# Patient Record
Sex: FEMALE | Race: WHITE | NOT HISPANIC OR LATINO | Employment: STUDENT | URBAN - METROPOLITAN AREA
[De-identification: names, ages, dates, MRNs, and addresses within clinical notes are randomized per-mention and may not be internally consistent; named-entity substitution may affect disease eponyms.]

---
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
## 2021-01-13 ENCOUNTER — OFFICE VISIT - HEALTHEAST (OUTPATIENT)
Dept: OTOLARYNGOLOGY | Facility: CLINIC | Age: 21
End: 2021-01-13

## 2021-01-13 DIAGNOSIS — Z90.89 S/P TONSILLECTOMY: ICD-10-CM

## 2021-01-14 ENCOUNTER — OFFICE VISIT - HEALTHEAST (OUTPATIENT)
Dept: PEDIATRICS | Facility: CLINIC | Age: 21
End: 2021-01-14

## 2021-01-14 DIAGNOSIS — F41.9 ANXIETY: ICD-10-CM

## 2021-01-14 DIAGNOSIS — D50.9 IRON DEFICIENCY ANEMIA, UNSPECIFIED IRON DEFICIENCY ANEMIA TYPE: ICD-10-CM

## 2021-01-14 DIAGNOSIS — R53.83 FATIGUE, UNSPECIFIED TYPE: ICD-10-CM

## 2021-01-14 ASSESSMENT — ANXIETY QUESTIONNAIRES
1. FEELING NERVOUS, ANXIOUS, OR ON EDGE: MORE THAN HALF THE DAYS
IF YOU CHECKED OFF ANY PROBLEMS ON THIS QUESTIONNAIRE, HOW DIFFICULT HAVE THESE PROBLEMS MADE IT FOR YOU TO DO YOUR WORK, TAKE CARE OF THINGS AT HOME, OR GET ALONG WITH OTHER PEOPLE: SOMEWHAT DIFFICULT
4. TROUBLE RELAXING: MORE THAN HALF THE DAYS
2. NOT BEING ABLE TO STOP OR CONTROL WORRYING: MORE THAN HALF THE DAYS
6. BECOMING EASILY ANNOYED OR IRRITABLE: NOT AT ALL
5. BEING SO RESTLESS THAT IT IS HARD TO SIT STILL: NOT AT ALL
GAD7 TOTAL SCORE: 8
7. FEELING AFRAID AS IF SOMETHING AWFUL MIGHT HAPPEN: NOT AT ALL
3. WORRYING TOO MUCH ABOUT DIFFERENT THINGS: MORE THAN HALF THE DAYS

## 2021-01-15 ENCOUNTER — AMBULATORY - HEALTHEAST (OUTPATIENT)
Dept: LAB | Facility: CLINIC | Age: 21
End: 2021-01-15

## 2021-01-15 DIAGNOSIS — F41.9 ANXIETY: ICD-10-CM

## 2021-01-15 DIAGNOSIS — R53.83 FATIGUE, UNSPECIFIED TYPE: ICD-10-CM

## 2021-01-15 LAB
BASOPHILS # BLD AUTO: 0.1 THOU/UL (ref 0–0.2)
BASOPHILS NFR BLD AUTO: 1 % (ref 0–2)
CHOLEST SERPL-MCNC: 161 MG/DL
EOSINOPHIL # BLD AUTO: 0.2 THOU/UL (ref 0–0.4)
EOSINOPHIL NFR BLD AUTO: 2 % (ref 0–6)
ERYTHROCYTE [DISTWIDTH] IN BLOOD BY AUTOMATED COUNT: 16.7 % (ref 11–14.5)
FASTING STATUS PATIENT QL REPORTED: YES
FERRITIN SERPL-MCNC: 17 NG/ML (ref 6–40)
HCT VFR BLD AUTO: 33.9 % (ref 35–47)
HDLC SERPL-MCNC: 49 MG/DL
HGB BLD-MCNC: 11.2 G/DL (ref 12–16)
LDLC SERPL CALC-MCNC: 79 MG/DL
LYMPHOCYTES # BLD AUTO: 2.7 THOU/UL (ref 0.8–4.4)
LYMPHOCYTES NFR BLD AUTO: 25 % (ref 20–40)
MCH RBC QN AUTO: 23.7 PG (ref 27–34)
MCHC RBC AUTO-ENTMCNC: 33 G/DL (ref 32–36)
MCV RBC AUTO: 72 FL (ref 80–100)
MONOCYTES # BLD AUTO: 0.7 THOU/UL (ref 0–0.9)
MONOCYTES NFR BLD AUTO: 6 % (ref 2–10)
NEUTROPHILS # BLD AUTO: 7.4 THOU/UL (ref 2–7.7)
NEUTROPHILS NFR BLD AUTO: 67 % (ref 50–70)
PLATELET # BLD AUTO: 353 THOU/UL (ref 140–440)
PMV BLD AUTO: 8.5 FL (ref 7–10)
RBC # BLD AUTO: 4.73 MILL/UL (ref 3.8–5.4)
T4 FREE SERPL-MCNC: 0.8 NG/DL (ref 0.7–1.8)
TRIGL SERPL-MCNC: 163 MG/DL
TSH SERPL DL<=0.005 MIU/L-ACNC: 1.02 UIU/ML (ref 0.3–5)
WBC: 11.1 THOU/UL (ref 4–11)

## 2021-01-18 ENCOUNTER — COMMUNICATION - HEALTHEAST (OUTPATIENT)
Dept: PEDIATRICS | Facility: CLINIC | Age: 21
End: 2021-01-18

## 2021-01-18 DIAGNOSIS — E55.9 VITAMIN D INSUFFICIENCY: ICD-10-CM

## 2021-01-18 LAB
25(OH)D3 SERPL-MCNC: 17.3 NG/ML (ref 30–80)
25(OH)D3 SERPL-MCNC: 17.3 NG/ML (ref 30–80)

## 2021-05-27 ASSESSMENT — PATIENT HEALTH QUESTIONNAIRE - PHQ9
SUM OF ALL RESPONSES TO PHQ QUESTIONS 1-9: 5
SUM OF ALL RESPONSES TO PHQ QUESTIONS 1-9: 6

## 2021-05-28 ASSESSMENT — ANXIETY QUESTIONNAIRES
GAD7 TOTAL SCORE: 8
GAD7 TOTAL SCORE: 3

## 2021-05-28 ASSESSMENT — ASTHMA QUESTIONNAIRES
ACT_TOTALSCORE: 25
ACT_TOTALSCORE: 25

## 2021-05-30 VITALS — WEIGHT: 167 LBS

## 2021-05-30 NOTE — PROGRESS NOTES
[facility] Well Child Check    ASSESSMENT & PLAN  Maya Fay is a 19 y.o. who has normal growth and normal development.    Diagnoses and all orders for this visit:    Anxiety  -     FLUoxetine (PROZAC) 40 MG capsule; Take 1 capsule (40 mg total) by mouth daily.  Dispense: 90 capsule; Refill: 1    Mild intermittent asthma without complication  -     albuterol (PROAIR HFA) 90 mcg/actuation inhaler; Inhale 2 puffs every 4 (four) hours as needed.  Dispense: 1 Inhaler; Refill: 5    Encounter for annual health examination  -     Chlamydia trachomatis & Neisseria gonorrhoeae, Amplified Detection  -     Hearing Screening    Anxiety  We talked about having you go back to see your therapist a few times now before heading off to school  I wonder if you have been experiencing some symptoms of mild depression  i'm glad you are starting to feel better  We decided to leave the medicine as it is for now:  Fluoxetine 40 mg daily  RTC 6 months for med check, or sooner with worsening symptoms    Intermittent Asthma  Doing well with rare need for inhaler  Refill provided    Obesity  Reviewed diet and exercise, healthy choices    Return to clinic in 1 year for a Well Child Check or sooner as needed    IMMUNIZATIONS/LABS  No immunizations due today.    REFERRALS  Dental:  Recommend routine dental care as appropriate., The patient has already established care with a dentist.  Other:  No additional referrals were made at this time.    ANTICIPATORY GUIDANCE  I have reviewed age appropriate anticipatory guidance.    HEALTH HISTORY  Do you have any concerns that you'd like to discuss today?: No concerns     I have also been following Maya for anxiety  She is taking Fluoxetine 40 mg  This was started about two years ago    Overall has been doing well  Does share that the past few weeks she's felt a little off - anxious and overwhelmed - not sure if related to going away to college    Loves to be around people and in summer she has  more time alone and sometimes this is hard when she's alone too much    Notices that anxiety can make her tired and sometimes sad  Sleeps more than usual    Previously saw a therapist but had been doing really well so the therapist advised she could stop - weaned down gradually  Yessica ENRIQUE (somewhere in [place])    Not able to be as active lately  Had shoulder surgery six months ago so couldn't be active    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 Pantoja and previously was on Symbicort but has been off this for a while  Only inhaler she uses is Albuterol      Roomed by: nate    Refills needed? No    Do you have any forms that need to be filled out? No        Do you have any significant health concerns in your family history?: No  Family History   Problem Relation Age of Onset     Seizures Father      Since your last visit, have there been any major changes in your family, such as a move, job change, separation, divorce, or death in the family?: No  Has a lack of transportation kept you from medical appointments?: No    Home  Who lives in your home?:   50%Mom and sisters and  Dads 50%  Social History     Social History Narrative     Not on file     Do you have any concerns about losing your housing?: No  Is your housing safe and comfortable?: Yes  Do you have any trouble with sleep?:  Yes    Education  What school do you child attend?:  Graduated Going to [employer]    What grade are you in?:  [employer] in [employer]  [place]   How do you perform in school (grades, behavior, attention, homework?: Good     Eating  Do you eat regular meals including fruits and vegetables?:  yes  What are you drinking (cow's milk,  "water, soda, juice, sports drinks, energy drinks, etc)?: cow's milk- skim, water and juice  Have you been worried that you don't have enough food?: No  Do you have concerns about your body or appearance?:  No    Activities  Do you have friends?:  yes  Do you get at least one hour of physical activity per day?:  yes  How many hours a day are you in front of a screen other than for schoolwork (computer, TV, phone)?: 1 hr or more   What do you do for exercise?:  Swim ,Work out   Do you have interest/participate in community activities/volunteers/school sports?:  Yes Swim Clun    MENTAL HEALTH SCREENING  PHQ-2 Total Score: 2 (7/22/2019  9:00 AM)    PHQ-9 Total Score: 8 (7/22/2019  9:00 AM)    SUDHEER-7 Total: 8 (7/22/2019  9:00 AM)  SUDHEER 7 Total Score: 3 (1/14/2019  2:00 PM)        VISION/HEARING  Vision: Completed. See Results  Hearing:  Completed. See Results    No exam data present    TB Risk Assessment:  The patient and/or parent/guardian answer positive to:  patient and/or parent/guardian answer 'no' to all screening TB questions    Dyslipidemia Risk Screening  Have either of your parents or any of your grandparents had a stroke or heart attack before age 55?: No  Any parents with high cholesterol or currently taking medications to treat?: Yes: maybe Dad?      Dental  When was the last time you saw the dentist?: 3-6 months ago   Parent/Guardian declines the fluoride varnish application today. Fluoride not applied today.    Patient Active Problem List   Diagnosis     Overweight     Allergic Rhinitis     Vocal cord dysfunction     Anxiety     Lumbar herniated disc     Mild intermittent asthma without complication       Drugs  Does the patient use tobacco/alcohol/drugs?:  no    Safety  Does the patient have any safety concerns (peer or home)?:  no  Does the patient use safety belts, helmets and other safety equipment?:  yes    Sex  Have you ever had sex?:  No    MEASUREMENTS  Height:  5' 5\" (1.651 m)  Weight: (!) 221 lb " (100.2 kg)  BMI: Body mass index is 36.78 kg/m .  Blood Pressure: 110/60  Blood pressure percentiles are not available for patients who are 18 years or older.    PHYSICAL EXAM  GEN: alert, well appearing  EYES: clear, nl red reflex  R EAR: canal clear, TM pearly gray  L EAR: canal clear, TM pearly gray  NOSE: clear  OROPHARYNX: clear  NECK: supple, no significant LAD  CVS: RRR, no murmur  LUNGS: clear, no increased work of breathing  ABD: soft, non-tender, non-distended  : nl female sherrie 4/5  EXT: warm, well perfused, no swelling  MSK: nl muscle bulk, spine straight  NEURO: CN grossly intact, nl strength in UE and LE, nl gait, no dysmetria  SKIN: clear        Jennifer Conte MD

## 2021-05-31 VITALS — BODY MASS INDEX: 30.59 KG/M2 | HEIGHT: 65 IN | WEIGHT: 183.6 LBS

## 2021-05-31 VITALS — WEIGHT: 167.9 LBS | HEIGHT: 65 IN | BODY MASS INDEX: 27.97 KG/M2

## 2021-05-31 VITALS — HEIGHT: 65 IN | BODY MASS INDEX: 29.87 KG/M2 | WEIGHT: 179.3 LBS

## 2021-06-01 VITALS — WEIGHT: 183 LBS | BODY MASS INDEX: 30.93 KG/M2

## 2021-06-01 VITALS — WEIGHT: 201 LBS | BODY MASS INDEX: 33.49 KG/M2 | HEIGHT: 65 IN

## 2021-06-02 VITALS — BODY MASS INDEX: 34.89 KG/M2 | HEIGHT: 65 IN | WEIGHT: 209.4 LBS

## 2021-06-02 NOTE — TELEPHONE ENCOUNTER
Referral Request  Type of referral: Psychiatry  Who s requesting: Patient's mom, Ely (no consent)  Why the request: Caller stated the patient told her the anxiety medication is not helping to cover all of the patient's depression and anxiety. Caller stated the patient would like a more comprehensive evaluation from a mental health provider.  Have you been seen for this request: No  Does patient have a preference on a group/provider? No  Okay to leave a detailed message?  No  [phone] - to reach the patient

## 2021-06-02 NOTE — TELEPHONE ENCOUNTER
I called and spoke with Maya  I offered that I would be happy to see her regarding this concern also if she would like to schedule with me  Did let her know about a couple options for psychiatric med management if they'd prefer to go that route:    [facility]  [address] [place] [address]  [phone]    [facility]  [phone]    Maya did let me know that she would be ok with me speaking with her mom.  I asked her to sign a form here in clinic giving me and other permission to communicate with her mom directly when desired.  Maya plans to sign that when she is in next.  Maya will let her mom know that we spoke and they will decide how they want to proceed - either schedule an appointment with me here, or call to schedule with psychiatry.

## 2021-06-03 VITALS — BODY MASS INDEX: 36.82 KG/M2 | WEIGHT: 221 LBS | HEIGHT: 65 IN

## 2021-06-04 VITALS
BODY MASS INDEX: 37.09 KG/M2 | WEIGHT: 222.6 LBS | HEART RATE: 100 BPM | TEMPERATURE: 98.3 F | SYSTOLIC BLOOD PRESSURE: 110 MMHG | HEIGHT: 65 IN | DIASTOLIC BLOOD PRESSURE: 60 MMHG

## 2021-06-05 VITALS — WEIGHT: 229 LBS | HEIGHT: 65 IN | BODY MASS INDEX: 38.15 KG/M2

## 2021-06-05 VITALS
TEMPERATURE: 98.1 F | DIASTOLIC BLOOD PRESSURE: 74 MMHG | WEIGHT: 229 LBS | SYSTOLIC BLOOD PRESSURE: 112 MMHG | OXYGEN SATURATION: 99 % | HEIGHT: 65 IN | BODY MASS INDEX: 38.15 KG/M2 | HEART RATE: 70 BPM

## 2021-06-10 NOTE — PROGRESS NOTES
[facility] Well Child Check    ASSESSMENT & PLAN  Maya Fay is a 20 y.o. who has normal growth and normal development.    Diagnoses and all orders for this visit:    Tonsillar hypertrophy /  Recurrent tonsillitis  -     Ambulatory referral to ENT  Discussed possible option of tonsillectomy given recurrent infection and sleep disruption.  Recommended discussion of this with ENT - referral placed.    Mild intermittent asthma without complication  -     albuterol (PROAIR HFA) 90 mcg/actuation inhaler; Inhale 2 puffs every 4 (four) hours as needed.  Dispense: 1 Inhaler; Refill: 5    Encounter for annual health examination  -     Hearing Screening  -     Vision Screening  -     Chlamydia trachomatis & Neisseria gonorrhoeae, Amplified Detection  -     HM2(CBC w/o Differential)    Iron deficiency anemia, unspecified iron deficiency anemia type  Recommended daily iron supplement    Anxiety  Doing well on Sertraline  Followed by Psychiatry    Obesity  Discussed healthy habits including regular exercise and healthy eating      Return to clinic in 1 year for a Well Child Check or sooner as needed    IMMUNIZATIONS/LABS  No immunizations due today.    REFERRALS  Dental:  Recommend routine dental care as appropriate., The patient has already established care with a dentist.  Other:  Referrals were made for ENT    ANTICIPATORY GUIDANCE  I have reviewed age appropriate anticipatory guidance.    HEALTH HISTORY  Do you have any concerns that you'd like to discuss today?: No concerns     In the past I have followed Maya for issues related to anxiety - last year when I saw her she was taking Fluoxetine 40 mg daily and doing well  Today Maya reports that she went to see a psychiatrist last fall when she was struggling after her grandfather  - was changed to Sertraline  This has been working well - taking 100 mg daily and this is working well  Plans to continue to follow with the psychiatrist every 6 months    Had  "tonsillitis multiple times during the past year  Recalls 2-3 episodes of antibiotics but several other times when she felt her tonsils were so swollen it was hard to breathe but got better with ibuprofen  Has always caused a lot of problems with big tonsils  Maya is wondering if she should consider tonsillectomy  Feels like this is getting worse over time especially in last 2-3 years  Also feels like she's having trouble with breathing while sleeping - wakes up feeling like she's \"suffocating\"  Snoring lately which is new  Often has a sore throat  Has tried breathe right but doesn't help  Not sure if she's having any apnea  Does feel relatively well rested    Almost done with isotretinoin for acne - this is second round for Maya and happy with results    Periods are often really hard - lots of cramping and very uncomfortable  Also often gets headaches related to periods  Bleeding is more heavy lately  Periods are very irregular as well    Has Albuterol inhaler available but thinks it's been about a year since she last used it  Doing orange theory now and feels like her breathing is fine    No question data found.    Do you have any significant health concerns in your family history?: No  Family History   Problem Relation Age of Onset     Seizures Father      Since your last visit, have there been any major changes in your family, such as a move, job change, separation, divorce, or death in the family?: No  Has a lack of transportation kept you from medical appointments?: No    Home  Who lives in your home?:  Mom,dad,3 sisters  Social History     Social History Narrative     Not on file     Do you have any concerns about losing your housing?: No  Is your housing safe and comfortable?: Yes  Do you have any trouble with sleep?:  No    Education  What school do you child attend?:  [employer]  What grade are you in?:  College  How do you perform in school (grades, behavior, attention, homework?: Very well "     Eating  Do you eat regular meals including fruits and vegetables?:  yes  What are you drinking (cow's milk, water, soda, juice, sports drinks, energy drinks, etc)?: water  Have you been worried that you don't have enough food?: No  Do you have concerns about your body or appearance?:  No    Activities  Do you have friends?:  yes  Do you get at least one hour of physical activity per day?:  yes  How many hours a day are you in front of a screen other than for schoolwork (computer, TV, phone)?:  4  What do you do for exercise?:  Swim,walk  Do you have interest/participate in community activities/volunteers/school sports?:  yes    VISION/HEARING  Vision: Completed. See Results  Hearing:  Completed. See Results     Hearing Screening    125Hz 250Hz 500Hz 1000Hz 2000Hz 3000Hz 4000Hz 6000Hz 8000Hz   Right ear:   20 20 20  20 20    Left ear:   20 20 20  20 20       Visual Acuity Screening    Right eye Left eye Both eyes   Without correction: 10/10 10/10 10/10   With correction:      Comments: Plus lens passed.      MENTAL HEALTH SCREENING  No flowsheet data found.  Social-emotional & mental health screening: Pediatric Symptom Checklist-Youth PASS (<30 pass), no followup necessary  No concerns  Does have anxiety but stable and doing well       SUDHEER 7 Total Score: 3 (8/24/2020  8:00 AM)    PHQ-A Total Score 8/24/2020   PHQ-A Total Score 6         TB Risk Assessment:  The patient and/or parent/guardian answer positive to:  no known risk of TB    Dyslipidemia Risk Screening  Have either of your parents or any of your grandparents had a stroke or heart attack before age 55?: No  Any parents with high cholesterol or currently taking medications to treat?: Yes: dad     Dental  When was the last time you saw the dentist?: 3-6 months ago   Parent/Guardian declines the fluoride varnish application today. Fluoride not applied today.    Patient Active Problem List   Diagnosis     Allergic Rhinitis     Vocal cord dysfunction      "Anxiety     Lumbar herniated disc     Mild intermittent asthma without complication     Obesity without serious comorbidity, unspecified classification, unspecified obesity type     Iron deficiency anemia, unspecified iron deficiency anemia type       Drugs  Does the patient use tobacco/alcohol/drugs?: Occ moderate alcohol, no smoking/vaping/drugs  Safety  Does the patient have any safety concerns (peer or home)?:  no  Does the patient use safety belts, helmets and other safety equipment?:  yes    Sex  Have you ever had sex?:  No    MEASUREMENTS  Height:  5' 5\" (1.651 m)  Weight: (!) 222 lb 9.6 oz (101 kg)  BMI: Body mass index is 37.04 kg/m .  Blood Pressure: 110/60  Blood pressure percentiles are not available for patients who are 18 years or older.    PHYSICAL EXAM  GEN: alert, well appearing  EYES: clear, nl red reflex  R EAR: canal clear, TM pearly gray  L EAR: canal clear, TM pearly gray  NOSE: clear  OROPHARYNX: clear - tonsils 3+ and normal in appearance  NECK: supple, no significant LAD  CVS: RRR, no murmur  LUNGS: clear, no increased work of breathing  ABD: soft, non-tender, non-distended  : deferred  EXT: warm, well perfused, no swelling  MSK: nl muscle bulk, spine straight  NEURO: CN grossly intact, nl strength in UE and LE, nl gait, no dysmetria  SKIN: clear        Jennifer Conte MD    "

## 2021-06-11 NOTE — PROGRESS NOTES
HISTORY OF PRESENT ILLNESS  Asked to see by Dr. Conte for evaluation intermittent pain in throat. Patient reports that she wants her tonsils removed. She reports that they are very uncomfortable. They are swollen a lot. She has had tonsillitis numerous times. Every time she is sick they swell up. She reports that once a month they swell. They are also sore. It has gotten noticeably worse in the last year. She reports more frequent sore throats, swollen tonsillitis and snoring. She was diagnosed with tonsillitis 3-4 times in the first 6 months of the year. Patient has missed school prior to COVID precautions.     REVIEW OF SYSTEMS  Review of Systems: a 10-system review was performed. Pertinent positives are noted in the HPI and on a separate scanned document in the chart.    PMH, PSH, FH and SH has documented in the EHR.      EXAM    CONSTITUTIONAL  General Appearance:  Normal, well developed, well nourished, no obvious distress  Ability to Communicate:  communicates appropriately.    HEAD AND FACE  Appearance and Symmetry:  Normal, no scalp or facial scarring or suspicious lesions.  Paranasal sinuses tenderness:  Normal, Paranasal sinuses non tender    EARS  Clinical speech reception threshold:  Normal, able to hear normal speech.  Auricle:  Normal, Auricles without scars, lesions, masses.  External auditory canal:  Normal, External auditory canal normal.  Tympanic membrane:  Normal, Tympanic membranes normal without swelling or erythema.  Tympanic membrane mobility:  Normal, Normal tympanic membrane mobility.    NOSE (speculum or scope)  Architecture:  Normal, Grossly normal external nasal architecture with no masses or lesions.  Mucosa:  Normal mucosa, No polyps or masses.  Septum:  Normal, Septum non-obstructing.  Turbinates:  Normal, No turbinate abnormalities    ORAL CAVITY AND OROPHARYNX  Lips:  Normal.  Dental and gingiva:  Normal, No obvious dental or gingival disease.  Tonsil: 2-3+. She admits that  today they aren't swollen and she is feeling fine.  Mucosa:  Normal, Moist mucous membranes.  Tongue:  Normal, Tongue mobile with no mucosal abnormalities  Hard and soft palate:  Normal, Hard and soft palate without cleft or mucosal lesions.  Oral pharynx:  Normal, Posterior pharynx without lesions or remarkable asymmetry.  Saliva:  Normal, Clear saliva.  Masses:  Normal, No palpable masses or pathologically enlarged lymph nodes.    NECK  Masses/lymph nodes:  Normal, No worrisome neck masses or lymph nodes.  Salivary glands:  Normal, Parotid and submandibular glands.  Trachea and larynx position:  Normal, Trachea and larynx midline.  Thyroid:  Normal, No thyroid abnormality.  Tenderness:  Normal, No cervical tenderness.  Suppleness:  Normal, Neck supple    NEUROLOGICAL  Speech pattern:  Normal, Proasaic    RESPIRATORY  Symmetry and Respiratory effort:  Normal, Symmetric chest movement and expansion with no increased intercostal retractions or use of accessory muscles.     IMPRESSION  Recurrent sore throat and tonsillitis. She reports her snoring has worsened with he enlarged tonsils.     RECOMMENDATION  I discussed tonsillectomy, goals and risks. I answered her questions. She is going to think about timing and call to schedule.    Malachi Lee MD

## 2021-06-11 NOTE — PROGRESS NOTES
[facility] Well Child Check    ASSESSMENT & PLAN  Maya Fay is a 17  y.o. 5  m.o. who has normal growth and normal development.    Diagnoses and all orders for this visit:    Encounter for routine child health examination without abnormal findings  -     Meningococcal MCV4P  -     Hemoglobin  -     Lipid Profile  -     Hearing Screening  -     Vision Screening  -     Vitamin D, Total (25-Hydroxy)    Anxiety  -     Thyroid Cascade    Other orders  -     FLUoxetine (PROZAC) 10 MG tablet; Take 1 tablet (10 mg total) by mouth daily.  Dispense: 30 tablet; Refill: 0  -     Meningococcal B (PF)        Overweight  BMI at 96 percentile  Discussed diet - limiting snacking and being mindful of healthy choices  Discussed exercise - doing well with this    The following nutrition counseling was performed this visit:  recommendation to change food intake  The following physical activity counseling was performed this visit: giving encouragement to exercise    Anxiety - new diagnosis today (although symptoms have been present chronically)  Discussed with mom and Maya together, as well as with Maya privately  Encouraged establishing care with a counselor - Maya is interested in this - list of options provided  Given the chronic nature of symptoms and impact on daily life, discussed option of using anti-anxiety medication - mom and Maya are both interested in proceeding with this - Maya states she just wants to feel better    Starting Fluoxetine 10 mg daily - Rx sent to pharmacy for one month supply  Please return in two weeks to follow-up    I will put you on my schedule for:  Monday July 31 at 9am    Mental Health Providers - list provided    In addition to usual well care, 15 minutes spent in discussion of mental health concerns    Return to clinic in 1 year for a Well Child Check or sooner as needed    IMMUNIZATIONS/LABS  Immunizations were reviewed and orders were placed as appropriate. and I have  discussed the risks and benefits of all of the vaccine components with the patient/parents.  All questions have been answered.    REFERRALS  Dental:  Recommend routine dental care as appropriate., The patient has already established care with a dentist.  Other:  No additional referrals were made at this time.    ANTICIPATORY GUIDANCE  I have reviewed age appropriate anticipatory guidance.    HEALTH HISTORY  Do you have any concerns that you'd like to discuss today?: having some anxiety lately and has been stressed out, was told she was anemic while trying to give blood this summer      This past spring, tried to donate blood but was told hemoglobin was borderline low and wasn't able to donate    Does take vitamin D but lately not been regular with taking multivitamin    Lately noticing more trouble with worrying and anxiety  Has always tended to be a bit anxious but noticing this more lately  Can stress out about swimming, school, job, friends  Did previously see a counselor around time when parents split up    Does see this coming up in life more lately and affecting things  First noticed in swimming last fall - best one in practice but not as good in meets  Not doing as well in school - still fine - As and Bs but feels like she should be getting all As  Noticing her mind wnaders during day - due to worrying  Sometimes takes on worries from other people    Thinks she may have had some little panic attacks during school year  Struggles with change in routine     Even when she has a really good day, has a hard time enjoying it 100% - always feels like she's waiting for something bad to happen  Otherwise does feel as though she is a happy person     Reports doing well with sleeping - good sleeper    There is family history - especially on dad's side - he suffers from anxiety, as well as paternal grandmother    Does see dermatologist for acne  Uses topical creams for this  Starting a new OCP next month to try  this  Reports that periods are fairly irregular  No heavy bleeding     Continues to see Dr. Pantoja related to asthma  Doing well with this lately        Roomed by: anny    Accompanied by Mother    Refills needed? No    Do you have any forms that need to be filled out? No        Do you have any significant health concerns in your family history?: No  No family history on file.  Since your last visit, have there been any major changes in your family, such as a move, job change, separation, divorce, or death in the family?: No    Home  Who lives in your home?:  Mom-50% and dad- 50%- also has 3 sisters  Social History     Social History Narrative     Do you have any trouble with sleep?:  No    Education  What school does your child attend?:  [employer]  What grade is your child in?:  11th- fall 2017  How does the patient perform in school (grades, behavior, attention, homework?: no issues     Eating  Does patient eat regular meals including fruits and vegetables?:  yes  What is the patient drinking (cow's milk, water, soda, juice, sports drinks, energy drinks, etc)?: cow's milk- skim, water and juice  Does patient have concerns about body or appearance?:  No    Activities  Does the patient have friends?:  yes  Does the patient get at least one hour of physical activity per day?:  yes, swim  Does the patient have less than 2 hours of screen time per day (aside from homework)?:  no, 1-6hrs  What does your child do for exercise?:  swimming  Does the patient have interest/participate in community activities/volunteers/school sports?:  No, but has a job  Being a  for [employer] SCREENING  PHQ-2 Total Score: 0 (7/17/2017  8:00 AM)  No Data Recorded     SUDHEER-7 Total: 13 (7/17/2017  8:00 AM)         VISION/HEARING  Vision: Completed. See Results  Hearing:  Completed. See Results     Hearing Screening    125Hz 250Hz 500Hz 1000Hz 2000Hz 3000Hz 4000Hz 6000Hz 8000Hz   Right ear:   20 20 20  20    "  Left ear:   20 20 20  20        Visual Acuity Screening    Right eye Left eye Both eyes   Without correction: 20/15 20/15    With correction:          TB Risk Assessment:  The patient and/or parent/guardian answer positive to:  patient and/or parent/guardian answer 'no' to all screening TB questions    Dental  Is your child being seen by a dentist?  Yes  Flouride Varnish Application Screening  Is child seen by dentist?     Yes    Patient Active Problem List   Diagnosis     Overweight     Cough Variant Asthma     Mild persistent asthma without complication     Allergic Rhinitis     Vocal cord dysfunction       Drugs  Does the patient use tobacco/alcohol/drugs?:  no    Safety  Does the patient have any safety concerns (peer or home)?:  no  Does the patient use safety belts, helmets and other safety equipment?:  yes    Sex  Is the patient sexually active?:  No    HEADSS - all negative except mental health as described above  Denies any suicidal thoughts    MEASUREMENTS  Height:  5' 4.5\" (1.638 m)  Weight: 183 lb 9.6 oz (83.3 kg)  BMI: Body mass index is 31.03 kg/(m^2).  Blood Pressure: 112/68  Blood pressure percentiles are 50 % systolic and 55 % diastolic based on NHBPEP's 4th Report. Blood pressure percentile targets: 90: 125/81, 95: 129/84, 99 + 5 mmH/97.    PHYSICAL EXAM  GEN: alert, well appearing  EYES: clear  R EAR: canal clear, TM pearly gray  L EAR: canal clear, TM pearly gray  NOSE: clear  OROPHARYNX: clear  NECK: supple, no significant LAD  CVS: RRR, no murmur  LUNGS: clear, no increased work of breathing  ABD: soft, non-tender, non-distended  EXT: warm, well perfused, no swelling  MSK: nl muscle bulk, spine straight - normal screening sports exam  NEURO: CN grossly intact, nl strength in UE and LE, nl gait, no dysmetria  SKIN: clear        Jennifer Conte MD    "

## 2021-06-12 NOTE — TELEPHONE ENCOUNTER
Returned a call from Maya's mother (Ely) regarding surgery scheduling. They were looking for a date around December 16th -18th for surgery.   I left her a message letting her know I was able to get Maya scheduled for 12/18 I will be sending out a letter with detailed information to their home address. If they have any questions to call me.

## 2021-06-12 NOTE — PROGRESS NOTES
"[facility] Note   7/31/2017 9:09 AM     HPI:    Here for F/U regarding anxiety  I saw Maya for wellness visit two weeks ago and we discussed concern with anxiety - suggested looking for a counselor and also started Fluoxetine 10 mg daily    Initially noticed some stomach ache and felt groggy  Taking in evening now which is better  No side effects any more    Overall has noticed a big improvement   Still some worries but able to get over it more quickly    Sleeping  better    Mom notices that she seems lighter, more able to joke around    Dose seems good  Still working on finding a counselor - hoping to find someone close to home      PHYSICAL EXAM:   /66  Pulse 68  Ht 5' 4.5\" (1.638 m)  Wt 179 lb 4.8 oz (81.3 kg)  LMP 07/23/2017  BMI 30.3 kg/m2      GEN: no distress, pleasant and chatty  PSYCH: good eye contact, normal affect    PHQ-9 Total Score: 0 (7/31/2017  9:00 AM)  SUDHEER-7 Total: 3 (7/31/2017  9:00 AM)      ASSESSMENT:    Anxiety - doing well on new medication Fluoxetine    PLAN:    Look for locations near you:  [facility]  [facility]  I still feel as though a counselor would be helpful moving forward    If all is going well, return in about 2-3 months for next follow-up visit.  Please call or return sooner with any worsening or concerns    Continue on same medication, same dose - Fluoxetine 10 mg daily - I sent new Rx to pharmacy with two refills    Gianna Conte MD       "

## 2021-06-12 NOTE — TELEPHONE ENCOUNTER
Ely is calling to find out when her daughter's surgery is scheduled. She is a student and is requesting the surgery on 12/16 or 12/17 if possible. She can be reached at [phone].

## 2021-06-12 NOTE — TELEPHONE ENCOUNTER
Spoke with Ely over the phone. Appears she didn't receive my confirmation voicemail regarding Maya's surgery being set for 12/18. That date did work and they will be looking for the letter I sent in the mail. No further questions asked

## 2021-06-13 NOTE — TELEPHONE ENCOUNTER
Attempted phone call to Maya to help with scheduling appointment. There was no answer and mailbox was full.

## 2021-06-13 NOTE — TELEPHONE ENCOUNTER
Left a message for Maya that [facility] has requested a paper covid of her positive covid test from November at the day of surgery. She will not be able to have surgery without this. Notified [facility] that I left her a message.

## 2021-06-13 NOTE — PROGRESS NOTES
Preoperative Exam    Scheduled Procedure: TONSILLECTOMY   Surgery Date:  12/18/2020  Surgery Location: [facility], fax [phone]  Surgeon:  Dr. Lee     Assessment/Plan:     Pre-Op Exam  Tonsillar Hypertrophy  Snoring  Recurrent sore throat and tonsillitis  Intermittent Asthma - stable now and doing well  Obesity     Surgical Procedure Risk: Low (reported cardiac risk generally < 1%)  Have you had prior anesthesia?: Yes  Have you or any family members had a previous anesthesia reaction: No  Do you or any family members have a history of a clotting or bleeding disorder?:  No    APPROVAL GIVEN to proceed with proposed procedure, without further diagnostic evaluation    No special considerations    Functional Status: Age Appropriate [place]  Patient plans to recover at home with family.  Do you have any concerns regarding care after surgery?: No     Subjective:      Maya Fay is a 20 y.o. female who presents for a preoperative consultation.  Maya has a history of enlarged tonsils, recurrent sore throat and snoring - plans for tonsillectomy with Dr. Lee.  Otherwise Maya feels good today.  She did have covid in early November - symptoms were not too bad overall (felt achey all over, sore throat, headache and body aches and fever x one day - had positive test at the time) and she has completely recovered now.  Has had follow-up covid testing since illness and have been negative at last check.  No recent breathing concerns.    All other systems reviewed and are negative, other than those listed in the HPI.    Pertinent History  Any croup, wheezing or respiratory illness in the past 3 weeks?:  No  History of obstructive sleep apnea: No  Steroid use in the last 6 months: No  Any ibuprofen, NSAID or aspirin use in the last 2 weeks?: No  Prior Blood Transfusion: No  Prior Blood Transfusion Reaction: No  If for some reason prior to, during or after the procedure, if it is medically  indicated, would you be willing to have a blood transfusion?:  There is no transfusion refusal.  Any exposure in the past 3 weeks to chicken pox, Fifth disease, whooping cough, measles, tuberculosis?: No    Current Outpatient Medications   Medication Sig Dispense Refill     albuterol (PROAIR HFA) 90 mcg/actuation inhaler Inhale 2 puffs every 4 (four) hours as needed. 1 Inhaler 5     sertraline (ZOLOFT) 100 MG tablet Take 100 mg by mouth daily.       CLARAVIS 40 mg capsule TAKE 2 Capsule daily by mouth Take with food.       No current facility-administered medications for this visit.         No Known Allergies    Patient Active Problem List   Diagnosis     Allergic Rhinitis     Vocal cord dysfunction     Anxiety     Lumbar herniated disc     Mild intermittent asthma without complication     Obesity without serious comorbidity, unspecified classification, unspecified obesity type     Iron deficiency anemia, unspecified iron deficiency anemia type     Chronic tonsillitis       No past medical history on file.    Past Surgical History:   Procedure Laterality Date     ROTATOR CUFF REPAIR  01/2019     WISDOM TOOTH EXTRACTION  2017       Social History     Socioeconomic History     Marital status: Single     Spouse name: Not on file     Number of children: Not on file     Years of education: Not on file     Highest education level: Not on file   Occupational History     Not on file   Social Needs     Financial resource strain: Not on file     Food insecurity     Worry: Not on file     Inability: Not on file     Transportation needs     Medical: Not on file     Non-medical: Not on file   Tobacco Use     Smoking status: Never Smoker     Smokeless tobacco: Never Used   Substance and Sexual Activity     Alcohol use: Not on file     Drug use: Not on file     Sexual activity: Never   Lifestyle     Physical activity     Days per week: Not on file     Minutes per session: Not on file     Stress: Not on file   Relationships      "Social connections     Talks on phone: Not on file     Gets together: Not on file     Attends [employer] service: Not on file     Active member of club or organization: Not on file     Attends meetings of clubs or organizations: Not on file     Relationship status: Not on file     Intimate partner violence     Fear of current or ex partner: Not on file     Emotionally abused: Not on file     Physically abused: Not on file     Forced sexual activity: Not on file   Other Topics Concern     Not on file   Social History Narrative     Not on file       Patient Care Team:  Jennifer Conte MD as PCP - General  Jennifer Conte MD as Assigned PCP  Malachi Lee MD as Assigned Surgical Provider          Objective:     Vitals:    12/10/20 1515   BP: 112/74   Pulse: 70   Temp: 98.1  F (36.7  C)   SpO2: 99%   Weight: (!) 229 lb (103.9 kg)   Height: 5' 5\" (1.651 m)   LMP: 11/10/2020         Physical Exam:  GEN: alert, well appearing  EYES: clear, nl red reflex  R EAR: canal clear, TM pearly gray  L EAR: canal clear, TM pearly gray  NOSE: clear  OROPHARYNX: clear tonsils 3+ and normal in appearance  NECK: supple, no significant LAD  CVS: RRR, no murmur  LUNGS: clear, no increased work of breathing  ABD: soft, non-tender, non-distended  : deferred  EXT: warm, well perfused, no swelling  MSK: nl muscle bulk, spine straight  NEURO: CN grossly intact, nl strength in UE and LE, nl gait, no dysmetria  SKIN: clear      There are no Patient Instructions on file for this visit.    Labs:    Physical on 12/10/2020   Component Date Value Ref Range Status     Pregnancy Test, Urine 12/10/2020 Negative  Negative Final         Immunization History   Administered Date(s) Administered     DTaP / HiB 04/30/2002     DTaP, historic 2001, 2001, 2001, 02/17/2006     HPV Quadrivalent 07/20/2012, 10/12/2012, 02/22/2013     Hep A, historic 03/05/2008, 11/06/2008     Hep B, historic 2001, 2001, 2001     HiB, " historic,unspecified 2001, 2001, 2001     INFLUENZA,SEASONAL QUAD, PF, =/> 6months 12/18/2015, 12/19/2016, 11/19/2018, 12/31/2019     IPV 2001, 2001, 2001, 02/17/2006     Influenza D1n1-91, 12/31/2009, 02/08/2010     Influenza, Seasonal, Inj PF IIV3 09/30/2010, 12/08/2011, 10/12/2012, 11/08/2013     Influenza, inj, historic,unspecified 01/07/2004, 12/20/2004, 01/07/2005, 10/26/2006, 10/19/2007, 11/06/2008     Influenza, seasonal,quad inj 6-35 mos 12/31/2009, 11/07/2014     Influenza,seasonal,quad inj =/> 6months 10/23/2017     MMR 02/19/2002, 02/17/2006     Meningococcal B (PF) 07/17/2017, 07/19/2018     Meningococcal MCV4P 07/20/2012, 07/17/2017     Pneumo Conj 7-V(before 2010) 2001, 2001, 2001     Tdap 07/20/2012     Varicella 02/09/2002, 03/05/2008         Electronically signed by Jennifer Conte MD 12/10/20 3:11 PM

## 2021-06-13 NOTE — TELEPHONE ENCOUNTER
Spoke with Maya today regarding her surgery for 12/18. Due to a positive Covid test the surgery has been canceled. I was notified by [facility] via email of the positive results.  Maya understood but is unable to reschedule at this time due to her scheduled and said she would call back at a later date to schedule surgery when it best fits her schedule.

## 2021-06-13 NOTE — TELEPHONE ENCOUNTER
New Appointment Needed  What is the reason for the visit:    Pre-Op Appt Request  When is the surgery? :  12/18/2020  Where is the surgery?:   [facility]  Who is the surgeon? :  Dr Lee  What type of surgery is being done?: tonsillectomy  Provider Preference: Dr Conte or another provider  How soon do you need to be seen?: later next week or the following week, patient must test negative for covid before coming in  Waitlist offered?: No  Okay to leave a detailed message:  Yes

## 2021-06-13 NOTE — TELEPHONE ENCOUNTER
"Coronavirus (COVID-19) Notification    Caller Name (Patient, parent, daughter/son, grandparent, etc)  Patient: Maya Fay    Tested Positive on November and tested negative beginning of November    Reason for call  Notify of Positive Coronavirus (COVID-19) lab results, assess symptoms,  review [facility] recommendations    Lab Result    Lab test:  2019-nCoV rRt-PCR or SARS-CoV-2 PCR    Oropharyngeal AND/OR nasopharyngeal swabs is POSITIVE for 2019-nCoV RNA/SARS-COV-2 PCR (COVID-19 virus)    RN Recommendations/Instructions per  [facility] Coronavirus COVID-19 recommendations    Brief introduction script  Introduce self and then review script:  \"I am calling on behalf of [employer].  We were notified that your Coronavirus test (COVID-19) for was POSITIVE for the virus.  I have some information to relay to you but first I wanted to mention that the [place] Dept of Health will be contacting you shortly [it's possible MD already called Patient] to talk to you more about how you are feeling and other people you have had contact with who might now also have the virus.  Also, [facility] is Partnering with the [facility] for Covid-19 research, you may be contacted directly by research staff.\"    Assessment (Inquire about Patient's current symptoms)   Assessment   Current Symptoms at time of phone call: (if no symptoms, document No symptoms] Asymptomatic    Symptom onset (if applicable) N/A     If at time of call, Patients symptoms hare worsened, the Patient should contact 911 or have someone drive them to Emergency Dept promptly:      If Patient calling 911, inform 911 personal that you have tested positive for the Coronavirus (COVID-19).  Place mask on and await 911 to arrive.    If Emergency Dept, If possible, please have another adult drive you to the Emergency Dept but you need to wear mask when in contact with other people.        Monoclonal Antibody Administration    You may be eligible " "to receive a new treatment with a monoclonal antibody for preventing hospitalization in patients at high risk for complications from COVID-19.   This medication is still experimental and available on a limited basis; it is given through an IV and must be given at an infusion center. Please note that not all people who are eligible will receive the medication since it is in limited supply.     Are you interested in being considered for this medication?  No.  Does the patient fit the criteria: No    If patient qualifies based on above criteria:  \"We will contact you if you are selected to receive the medication in the next 1-2 days.   This is time sensitive and if you are not selected in the next 1-2 days, you will not receive the medication.  If you do not receive a call to schedule, you have not been selected.\"    Review information with Patient    Your result was positive. This means you have COVID-19 (coronavirus).  We have sent you a letter that reviews the information that I'll be reviewing with you now.    How can I protect others?    If you have symptoms: stay home and away from others (self-isolate) until:    You've had no fever--and no medicine that reduces fever--for 1 full day (24 hours). And      Your other symptoms have gotten better. For example, your cough or breathing has improved. And     At least 10 days have passed since your symptoms started. (If you ve been told by a doctor that you have a weak immune system, wait 20 days.)     If you don't have symptoms: Stay home and away from others (self-isolate) until at least 10 days have passed since your first positive COVID-19 test. (Date test collected).    During this time:    Stay in your own room, including for meals. Use your own bathroom if you can.    Stay away from others in your home. No hugging, kissing or shaking hands. No visitors.     Don't go to work, school or anywhere else.     Clean  high touch  surfaces often (doorknobs, counters, " handles, etc.). Use a household cleaning spray or wipes. You'll find a full list on the EPA website at www.epa.gov/pesticide-registration/list-n-disinfectants-use-against-sars-cov-2.     Cover your mouth and nose with a mask, tissue or other face covering to avoid spreading germs.    Wash your hands and face often with soap and water.    Caregivers in these groups are at risk for severe illness due to COVID-19:  o People 65 years and older  o People who live in a nursing home or long-term care facility  o People with chronic disease (lung, heart, cancer, diabetes, kidney, liver, immunologic)  o People who have a weakened immune system, including those who:  - Are in cancer treatment  - Take medicine that weakens the immune system, such as corticosteroids  - Had a bone marrow or organ transplant  - Have an immune deficiency  - Have poorly controlled HIV or AIDS  - Are obese (body mass index of 40 or higher)  - Smoke regularly    Caregivers should wear gloves while washing dishes, handling laundry and cleaning bedrooms and bathrooms.    Wash and dry laundry with special caution. Don't shake dirty laundry, and use the warmest water setting you can.    If you have a weakened immune system, ask your doctor about other actions you should take.    For more tips, go to www.cdc.gov/coronavirus/2019-ncov/downloads/10Things.pdf.    You should not go back to work until you meet the guidelines above for ending your home isolation. You don't need to be retested for COVID-19 before going back to work--studies show that you won't spread the virus if it's been at least 10 days since your symptoms started (or 20 days, if you have a weak immune system).    Employers: This document serves as formal notice of your employee's medical guidelines for going back to work. They must meet the above guidelines before going back to work in person.    How can I take care of myself?    1. Get lots of rest. Drink extra fluids (unless a doctor has  told you not to).    2. Take Tylenol (acetaminophen) for fever or pain. If you have liver or kidney problems, ask your family doctor if it's okay to take Tylenol.     Take either:     650 mg (two 325 mg pills) every 4 to 6 hours, or     1,000 mg (two 500 mg pills) every 8 hours as needed.     Note: Don't take more than 3,000 mg in one day. Acetaminophen is found in many medicines (both prescribed and over-the-counter medicines). Read all labels to be sure you don't take too much.    For children, check the Tylenol bottle for the right dose (based on their age or weight).    3. If you have other health problems (like cancer, heart failure, an organ transplant or severe kidney disease): Call your specialty clinic if you don't feel better in the next 2 days.    4. Know when to call 911: Emergency warning signs include:    Trouble breathing or shortness of breath    Pain or pressure in the chest that doesn't go away    Feeling confused like you haven't felt before, or not being able to wake up    Bluish-colored lips or face    5. Sign up for [employer]. We know it's scary to hear that you have COVID-19. We want to track your symptoms to make sure you're okay over the next 2 weeks. Please look for an email from [employer]--this is a free, online program that we'll use to keep in touch. To sign up, follow the link in the email. Learn more at [email or web address].    Where can I get more information?    [facility]: [email or web address]    Coronavirus Basics: www.health.[facility].[place]/diseases/coronavirus/basics.html    What to Do If You're Sick: www.cdc.gov/coronavirus/2019-ncov/about/steps-when-sick.html    Ending Home Isolation: www.cdc.gov/coronavirus/2019-ncov/hcp/disposition-in-home-patients.html     Caring for Someone with COVID-19: www.cdc.gov/coronavirus/2019-ncov/if-you-are-sick/care-for-someone.html     [facility] clinical trials (COVID-19 research studies):  clinicalaffairs.[facility]-clinical-trials     A Positive COVID-19 letter will be sent via Zing or the Mail.  (Exception, no letters sent to Presurgerical/Preprocedure Patients)    Marty Vallejo RN

## 2021-06-13 NOTE — ANESTHESIA CARE TRANSFER NOTE
Last vitals:   Vitals:    12/18/20 0653   BP: 134/80   Pulse: 72   Resp: 16   Temp: 36.6  C (97.9  F)   SpO2: 97%     Patient's level of consciousness is drowsy  Spontaneous respirations: yes  Maintains airway independently: yes  Dentition unchanged: yes  Oropharynx: oropharynx clear of all foreign objects    QCDR Measures:  ASA# 20 - Surgical Safety Checklist: WHO surgical safety checklist completed prior to induction    PQRS# 430 - Adult PONV Prevention: 4558F - Pt received => 2 anti-emetic agents (different classes) preop & intraop  ASA# 8 - Peds PONV Prevention: NA - Not pediatric patient, not GA or 2 or more risk factors NOT present  PQRS# 424 - Lala-op Temp Management: 4559F - At least one body temp DOCUMENTED => 35.5C or 95.9F within required timeframe  PQRS# 426 - PACU Transfer Protocol: - Transfer of care checklist used  ASA# 14 - Acute Post-op Pain: ASA14B - Patient did NOT experience pain >= 7 out of 10

## 2021-06-13 NOTE — ANESTHESIA POSTPROCEDURE EVALUATION
Patient: Maya Fay  Procedure(s):  TONSILLECTOMY (Bilateral)  Anesthesia type: general    Patient location: Phase II Recovery  Last vitals:   Vitals Value Taken Time   /73 12/18/20 0900   Temp 36.3  C (97.3  F) 12/18/20 0900   Pulse 64 12/18/20 0900   Resp 16 12/18/20 0900   SpO2 96 % 12/18/20 0900     Post vital signs: stable  Level of consciousness: awake and responds to simple questions  Post-anesthesia pain: pain controlled  Post-anesthesia nausea and vomiting: no  Pulmonary: unassisted, return to baseline  Cardiovascular: stable and blood pressure at baseline  Hydration: adequate  Anesthetic events: no    QCDR Measures:  ASA# 11 - Lala-op Cardiac Arrest: ASA11B - Patient did NOT experience unanticipated cardiac arrest  ASA# 12 - Lala-op Mortality Rate: ASA12B - Patient did NOT die  ASA# 13 - PACU Re-Intubation Rate: ASA13B - Patient did NOT require a new airway mgmt  ASA# 10 - Composite Anes Safety: ASA10A - No serious adverse event    Additional Notes:

## 2021-06-13 NOTE — TELEPHONE ENCOUNTER
She was found to be positive and wanted to know what she does about her surgery scheduled for Friday. I am sure they are going to want to cancel it. So she needs to call and discuss results with them. She will be off of covid restrictions in 14 days since she had no symptoms.  She understands and will call her surgery center.

## 2021-06-13 NOTE — TELEPHONE ENCOUNTER
Spoke with pt again who stated she was told she could move forward with surgery because she was already positive once and quarantined, she is now asymptomatic.   I spoke with Juan at [facility] who talked to Evelyn and others who stated that Maya was cleared to move forward with surgery considering the circumstances. As long as she was asymptomatic.  Surgery has been put back on the schedule for Friday 12/18 as previously scheduled.   Pt was notified.

## 2021-06-13 NOTE — ANESTHESIA PREPROCEDURE EVALUATION
Anesthesia Evaluation      Patient summary reviewed   No history of anesthetic complications     Airway   Mallampati: II  Neck ROM: full   Pulmonary - normal exam   (+) asthma  mild,                          Cardiovascular - negative ROS  Exercise tolerance: > or = 4 METS  Rhythm: regular        Neuro/Psych    (+) depression, anxiety/panic attacks,     Endo/Other    (+) obesity,   (-) not pregnant     GI/Hepatic/Renal    (+) GERD,        Other findings:     NPO > 8 hrs     COVID positive 11/20          Dental - normal exam                        Anesthesia Plan  Planned anesthetic: general endotracheal and total IV anesthesia      TIVA  Oral SALONI    ASA 3   Induction: intravenous   Anesthetic plan and risks discussed with: patient  Anesthesia plan special considerations: antiemetics,   Post-op plan: routine recovery

## 2021-06-14 NOTE — PATIENT INSTRUCTIONS - HE
For now, you can go ahead and start taking iron and vitamin D    Vitamin D - 2000 international unit(s) daily  Iron - ferrous sulfate 325 mg daily  These should be good starting doses - we can adjust if needed based upon lab results    Ordering lab work:  Hemoglobin, iron  Thyroid  Vitamin D  Cholesterol panel    Schedule lab visit to come into lab fasting sometime soon  Will be in touch with results when availale

## 2021-06-14 NOTE — PROGRESS NOTES
Maya Fay is a 20 y.o. female who is being evaluated via a billable video visit.      How would you like to obtain your AVS? MyChart.  If dropped from the video visit, the video invitation should be resent by: Text to cell phone: [phone]  Will anyone else be joining your video visit? No      Video Start Time: 3:02 PM  Assessment:  Anxiety and depression  Fatigue     CBC showed low hemoglobin 11.2  Recommended daily iron supplement and recheck this lab in 2-3 months    Plan:  Medication is being managed by psychiatry    Discussed doing lab work and what would be helpful and relevant given recent increased fatigue    For now, you can go ahead and start taking iron and vitamin D    Vitamin D - 2000 international unit(s) daily  Iron - ferrous sulfate 325 mg daily  These should be good starting doses - we can adjust if needed based upon lab results    Ordering lab work:  Hemoglobin, iron  Thyroid  Vitamin D  Cholesterol panel    Schedule lab visit to come into lab fasting sometime soon  Will be in touch with results when availale      Subjective     Maya Fay is 20 y.o. and presents to clinic today for the following health issues   HPI     Video visit with Maya today   Had apointment earlier today with psychiatrist at [facility]  Doing a medication adjustment related to increased symptoms of anxiety and depression  Still taking Sertraline - just increased from 100 mg to 150 mg as of this morning    Psychiatrist is recommending checking iron levels and vitamin D - he recommended scheduling a visit with me to discuss if some lab tests would be helpful here  Maya does report that she feels more tired lately and unmotivated    Does tend to have flare ups of anxiety and depression in the winter as well    Planning to start taking iron and vitamin D regardless  Hasn't been taking vitamins recently but has taken them on and off in the past    Did recently have her tonsils removed  The recovery was  tough but she is doing much better now and sleep is much improved    Did have hemoglobin checked last summer and was low at 11.8 with low MCV        Objective       Vitals:  No vitals were obtained today due to virtual visit.    Physical Exam  GEN: appears alert and interactive via video            Video-Visit Details    Type of service:  Video Visit    Video End Time (time video stopped): 3:18 PM  Originating Location (pt. Location): Home    Distant Location (provider location):  [facility]     Platform used for Video Visit: Sammy

## 2021-06-14 NOTE — PROGRESS NOTES
HISTORY OF PRESENT ILLNESS  Patient returns for recheck after tonsillectomy. She reports that she is doing well. She did have significant pain during that first week of recovery.     REVIEW OF SYSTEMS  Review of Systems: a 10-system review was performed. Pertinent positives are noted in the HPI and on a separate scanned document in the chart.    PMH, PSH, FH and SH has documented in the EHR.      EXAM    CONSTITUTIONAL  General Appearance:  Normal, well developed, well nourished, no obvious distress  Ability to Communicate:  communicates appropriately.    HEAD AND FACE  Appearance and Symmetry:  Normal, no scalp or facial scarring or suspicious lesions.    EYE  Normal external eye, conjunctiva, lids, cornea.       ORAL CAVITY AND OROPHARYNX  Typical postoperative appearance.  Lips:  Normal.  Dental and gingiva:  Normal, No obvious dental or gingival disease.  Mucosa:  Normal, Moist mucous membranes.  Tongue:  Normal, Tongue mobile with no mucosal abnormalities  Hard and soft palate:  Normal, Hard and soft palate without cleft or mucosal lesions.  Oral pharynx:  Normal, Posterior pharynx without lesions or remarkable asymmetry.  Saliva:  Normal, Clear saliva.  Masses:  Normal, No palpable masses or pathologically enlarged lymph nodes.    NEUROLOGICAL  Speech pattern:  Normal, Proasaic    RESPIRATORY  Symmetry and Respiratory effort:  Normal, Symmetric chest movement and expansion with no increased intercostal retractions or use of accessory muscles.     IMPRESSION  Doing as expected.     RECOMMENDATION  Follow up as needed.    Malachi Lee MD

## 2021-06-16 PROBLEM — F41.9 ANXIETY: Status: ACTIVE | Noted: 2017-07-18

## 2021-06-16 PROBLEM — J35.01 CHRONIC TONSILLITIS: Status: ACTIVE | Noted: 2020-10-16

## 2021-06-16 PROBLEM — D50.9 IRON DEFICIENCY ANEMIA, UNSPECIFIED IRON DEFICIENCY ANEMIA TYPE: Status: ACTIVE | Noted: 2020-08-24

## 2021-06-16 PROBLEM — E55.9 VITAMIN D INSUFFICIENCY: Status: ACTIVE | Noted: 2021-01-18

## 2021-06-16 PROBLEM — M51.26 LUMBAR HERNIATED DISC: Status: ACTIVE | Noted: 2018-07-19

## 2021-06-16 PROBLEM — J45.20 MILD INTERMITTENT ASTHMA WITHOUT COMPLICATION: Status: ACTIVE | Noted: 2018-07-19

## 2021-06-16 PROBLEM — E66.9 OBESITY WITHOUT SERIOUS COMORBIDITY, UNSPECIFIED CLASSIFICATION, UNSPECIFIED OBESITY TYPE: Status: ACTIVE | Noted: 2019-07-22

## 2021-06-17 NOTE — PROGRESS NOTES
[facility] Note   4/9/2018 1:23 PM     HPI:    Myaa is here for f/u med check related to anxiety  Taking Fluoxetine 20 mg daily  My last visit with Maya was six months ago and at that time she was doing well and no changes were made    11th grade this year  Overall things have been going well  Lately notices more difficulty with staying focused at times though  Starting to think more about plans for after graduation too    Overall feels pretty good  Does notice some difficulty with worrying about little things  Feels like she stresses about some stuff more than other kids  For example Prom is coming up and she finds herself very distracted and thinking a lot about what her plan for this may be    Mom describes Maya as very social  Has a lot of friends    But internally, she does feel as though her mind gets stuck thinking about things often and she has trouble moving on  Thinks she may benefit from an increase in dose  Also recalls that there was a brief time several months ago when she was accidentally taking twice her intended dose (40 mg daily instead of 20 mg daily) - remembers that during that time, she did notice quite an improvement in how she was feeling overall in terms of her level of anxiety    No concern about sleep or appetite  No significant recent illness including no headache, stomach ache, sore throat, cough, runny nose    Not currently seeing a counselor  Is open to this though  Mostly has been a matter of finding time and getting going with this that has been the barrier      PHYSICAL EXAM:   /68  Pulse 74  Wt 183 lb (83 kg)  LMP 04/05/2018  SpO2 98%    GEN: alert and pleasant  PSYCH: good eye contact, normal affect    SUDHEER-7 Total: 3 (4/9/2018  1:00 PM)  PHQ-9 Total Score: 1 (4/9/2018  1:00 PM)      ASSESSMENT:    Anxiety Follow-up - doing reasonably well but would likely benefit from modest increase in dose    PLAN:    Let's try an increase in dose of your Fluoxetine -  start with 30 mg (1 and 1/2 tabs) daily  Give this about 3-4 weeks - if you still feel as thought there is more room for improvmenet, please call and let me know and we will go ahead and increase to 40 mg.  If all is going well, next visit with me in six months - or sooner with any concerns    Also - think about seeing a counselor - I think this would be helpful    Mental Health Providers - list of options provided    Gianna Conte MD

## 2021-06-18 NOTE — LETTER
Letter by Jennifer Conte MD at      Author: Jennifer Conte MD Service: -- Author Type: --    Filed:  Encounter Date: 1/31/2019 Status: (Other)           Asthma Action Plan    Patient Name: Maya Fay  Patient YOB: 2000    Doctor's Name: Jennifer Conte    Emergency Contact:              Severity Classification: Intermittent    What triggers my asthma: colds and exercise    Always use a spacer with your inhaler, if prescribed    My child may carry, self administer and use quick-relief medicine at school with approval from the school nurse.    GREEN ZONE: Doing Well   No cough, wheeze, chest tightness or shortness of breath during the day or night  Can do your usual activities    Take these medicines before exercise if your asthma is exercise-induced:  Medicine How Much to Take When to take it   albuterol  (also known as ProAir, Ventolin and Proventil) 2 puffs with inhaler or   1 nebulizer treatment 15-30 minutes prior to exercise or sports     YELLOW ZONE: Asthma is Getting Worse   Cough, wheeze, chest tightness or shortness of breath or  Waking at night due to asthma, or  Can do some, but not all, usual activities.    Keep taking green zone medications and add quick-relief medicine:  Quick Relief Medicine How Much to Take When to take it   albuterol  (also known as ProAir, Ventolin and Proventil)  AND      Symbicort 80/4.5 2 puffs with inhaler or   1 nebulizer treatment      2 puffs  every 4 hours as needed          Twice daily During illness     If you do not feel better and your symptoms do not return to the green zone after one hour of the quick relief medication, then:    Take quick relief treatment again. Call your clinician within 1 hour.    Contact your clinician if you are using quick relief medication more than 2 times per week.    RED ZONE: Medical Alert!   Very short of breath, or  Quick relief medications have not helped, or  Cannot do usual activities, or  Symptoms  are same or worse after 24 hours in the Yellow Zone.    Continue green zone medicines and add:  Quick Relief Medicine Dose When to take it   albuterol  (also known as ProAir, Ventolin and Proventil) 2 puffs with inhaler  or  1 nebulizer treament may repeat every 20 minutes for up to 1 hour     IF ANY OF THESE ARE HAPPENING, SEEK EMERGENCY HELP AND CALL 911!   Your child is struggling to breathe and is clearly uncomfortable or  There is simply no clear improvement and you are worried about how to get through the next 30 minutes or  Trouble walking and talking due to shortness of breath, or  Lips or fingernails are blue    Provider signature:  Electronically Signed by Jennifer Conte   Date: 01/31/19        Parent signature:                                                        Date:  __________________

## 2021-06-18 NOTE — PATIENT INSTRUCTIONS - HE
Patient Instructions by Jennifer Conte MD at 8/24/2020  8:15 AM     Author: Jennifer Conte MD Service: -- Author Type: Physician    Filed: 8/24/2020  8:53 AM Encounter Date: 8/24/2020 Status: Signed    : Jennifer Conte MD (Physician)          Patient Education      [facility] HANDOUT- PATIENT  18 THROUGH 21 YEAR VISITS  Here are some suggestions from [employer] experts that may be of value to your family.     HOW YOU ARE DOING  Enjoy spending time with your family.  Find activities you are really interested in, such as sports, theater, or volunteering.  Try to be responsible for your schoolwork or work obligations.  Always talk through problems and never use violence.  If you get angry with someone, try to walk away.  If you feel unsafe in your home or have been hurt by someone, let us know. Hotlines and community agencies can also provide confidential help.  Talk with us if you are worried about your living or food situation. Community agencies and programs such as SNAP can help.  Dont smoke, vape, or use drugs. Avoid people who do when you can. Talk with us if you are worried about alcohol or drug use in your family.    YOUR DAILY LIFE  Visit the dentist at least twice a year.  Brush your teeth at least twice a day and floss once a day.  Be a healthy eater.  Have vegetables, fruits, lean protein, and whole grains at meals and snacks.  Limit fatty, sugary, salty foods that are low in nutrients, such as candy, chips, and ice cream.  Eat when youre hungry. Stop when you feel satisfied.  Eat breakfast.  Drink plenty of water.  Make sure to get enough calcium every day.  Have 3 or more servings of low-fat (1%) or fat-free milk and other low-fat dairy products, such as yogurt and cheese.  Women: Make sure to eat foods rich in folate, such as fortified grains and dark- green leafy vegetables.  Aim for at least 1 hour of physical activity every day.  Wear safety equipment when you play  sports.  Get enough sleep.  Talk with us about managing your health care and insurance as an adult.    YOUR FEELINGS  Most people have ups and downs. If you are feeling sad, depressed, nervous, irritable, hopeless, or angry, let us know or reach out to another health care professional.  Figure out healthy ways to deal with stress.  Try your best to solve problems and make decisions on your own.  Sexuality is an important part of your life. If you have any questions or concerns, we are here for you.    HEALTHY BEHAVIOR CHOICES  Avoid using drugs, alcohol, tobacco, steroids, and diet pills. Support friends who choose not to use.  If you use drugs or alcohol, let us know or talk with another trusted adult about it. We can help you with quitting or cutting down on your use.  Make healthy decisions about your sexual behavior.  If you are sexually active, always practice safe sex. Always use birth control along with a condom to prevent pregnancy and sexually transmitted infections.  All sexual activity should be something you want. No one should ever force or try to convince you.  Protect your hearing at work, home, and concerts. Keep your earbud volume down.    STAYING SAFE  Always be a safe and cautious .  Insist that everyone use a lap and shoulder seat belt.  Limit the number of friends in the car and avoid driving at night.  Avoid distractions. Never text or talk on the phone while you drive.  Do not ride in a vehicle with someone who has been using drugs or alcohol.  If you feel unsafe driving or riding with someone, call someone you trust to drive you.  Wear helmets and protective gear while playing sports. Wear a helmet when riding a bike, a motorcycle, or an ATV or when skiing or skateboarding.  Always use sunscreen and a hat when youre outside.  Fighting and carrying weapons can be dangerous. Talk with your parents, teachers, or doctor about how to avoid these situations.      Helpful Resources:  National  Domestic Violence Hotline: 919.711.6228   Consistent with Bright Futures: Guidelines for Health Supervision of Infants, Children, and Adolescents, 4th Edition  For more information, go to https://brightfutures.aap.org.

## 2021-06-19 NOTE — LETTER
Letter by Jennifer Conte MD at      Author: Jennifer Conte MD Service: -- Author Type: --    Filed:  Encounter Date: 7/22/2019 Status: (Other)           Asthma Action Plan    Patient Name: Maya Fay  Patient YOB: 2000    Doctor's Name: Jennifer Conte    Emergency Contact:              Severity Classification: Intermittent    What triggers my asthma: colds and exercise    Always use a spacer with your inhaler, if prescribed    GREEN ZONE: Doing Well   No cough, wheeze, chest tightness or shortness of breath during the day or night  Can do your usual activities    Take these medicines before exercise if your asthma is exercise-induced:  Medicine How Much to Take When to take it   albuterol  (also known as ProAir, Ventolin and Proventil) 2 puffs with inhaler or   1 nebulizer treatment 15-30 minutes prior to exercise or sports     YELLOW ZONE: Asthma is Getting Worse   Cough, wheeze, chest tightness or shortness of breath or  Waking at night due to asthma, or  Can do some, but not all, usual activities.    Keep taking green zone medications and add quick-relief medicine:  Quick Relief Medicine How Much to Take When to take it   albuterol  (also known as ProAir, Ventolin and Proventil) 2 puffs with inhaler or   1 nebulizer treatment every 4 hours as needed     If you do not feel better and your symptoms do not return to the green zone after one hour of the quick relief medication, then:    Take quick relief treatment again. Call your clinician within 1 hour.    Contact your clinician if you are using quick relief medication more than 2 times per week.    RED ZONE: Medical Alert!   Very short of breath, or  Quick relief medications have not helped, or  Cannot do usual activities, or  Symptoms are same or worse after 24 hours in the Yellow Zone.    Continue green zone medicines and add:  Quick Relief Medicine Dose When to take it   albuterol  (also known as ProAir, Ventolin and  Proventil) 2 puffs with inhaler  or  1 nebulizer treament may repeat every 20 minutes for up to 1 hour     IF ANY OF THESE ARE HAPPENING, SEEK EMERGENCY HELP AND CALL 911!   Your child is struggling to breathe and is clearly uncomfortable or  There is simply no clear improvement and you are worried about how to get through the next 30 minutes or  Trouble walking and talking due to shortness of breath, or  Lips or fingernails are blue    Provider signature:  Electronically Signed by Jennifer Conte   Date: 07/22/19        Parent signature:                                                        Date:  __________________

## 2021-06-19 NOTE — PROGRESS NOTES
[facility] Well Child Check    ASSESSMENT & PLAN  Yamini Fay is a 12  y.o. 3  m.o. who has normal growth and normal development.    Health Maintenance  Hearing Screen  Vision Screen  Bexsero vaccine    Anxiety  Doing well with current medication - Fluoxetine 40 mg daily  Has started seeing a counselor as well which has been very helpful  No changes made today  Not yet due for refills  RTC six months for next med check, or sooner PRN    Asthma - would classify as Intermittent  Previously has been followed by Dr. Pantoja for this - but doing well lately and hasn't been back there for a while  I offered to manage this moving forward and provide ongoing refills of Albuterol - mom and Maya would like to proceed with this plan   Doing well without much symbicort -continue to keep this in mind if you have a bad cold   Continue using albuterol as needed for exercise or when you have a cold  I am happy to prescribe these moving forward if things are going well    Back Pain due to herniated disk  Has already seen ortho related to this, and has been to PT  Manageable now although states that she needs Alleve daily for this  Advised that I would like her to try get to a place where she feels well without the Alleve  If not getting better, I would suggest going back to see the orthopedic specialist again    Bexsero vaccine given today    Overweight - BMI 97%  The following nutrition counseling was performed this visit:  dietary management education, guidance, and counseling  The following physical activity counseling was performed this visit: giving encouragement to exercise    Return to clinic in 1 year for a Well Child Check or sooner as needed    IMMUNIZATIONS/LABS  Immunizations were reviewed and orders were placed as appropriate. and I have discussed the risks and benefits of all of the vaccine components with the patient/parents.  All questions have been answered.    REFERRALS  Dental:  Recommend routine dental care  as appropriate., The patient has already established care with a dentist.  Other:  No additional referrals were made at this time.    ANTICIPATORY GUIDANCE  I have reviewed age appropriate anticipatory guidance.    HEALTH HISTORY  Do you have any concerns that you'd like to discuss today?: No      Started seeing Charlie Lozano at [facility] in [place] - counselor - going weekly x a couple months    Continues taking Fluoxetine 40 mg daily - this continues to feel as though it's helpful    Asthma  Continues to see Dr. Pantoja for asthma  Has used Symbicort in the past although not for months because she's been forgetting  Doing fine  Does use Albuterol prior to swimming usually  If forgets to use the inhaler though, she's usually fine though  No recent flares or concerns about asthma     Has had some issues with lower back - in tailbone area - sometimes spread to lower back  No specific injury  Went and saw a specialist related to this and was diagnosed with a slightly herniated disk  Went to PT - no longer going but does have exercises for home  Swimming helps with this as well  Taking Alleve daily related to this    Roomed by: nate    Accompanied by Mother    Refills needed? No    Do you have any forms that need to be filled out? No        Do you have any significant health concerns in your family history?: No  No family history on file.  Since your last visit, have there been any major changes in your family, such as a move, job change, separation, divorce, or death in the family?: No  Has a lack of transportation kept you from medical appointments?: No    Home  Who lives in your home?:  Mom 1/2 Dad 1/2  Social History     Social History Narrative     Do you have any concerns about losing your housing?: No  Is your housing safe and comfortable?: Yes  Do you have any trouble with sleep?:  No    Education  What school do you child attend?:  [employer]  What grade are you in?:  12th  How do you perform in  school (grades, behavior, attention, homework?: Good     Eating  Do you eat regular meals including fruits and vegetables?:  yes  What are you drinking (cow's milk, water, soda, juice, sports drinks, energy drinks, etc)?: cow's milk- skim, water and juice  Have you been worried that you don't have enough food?: No  Do you have concerns about your body or appearance?:  No    Activities  Do you have friends?:  yes  Do you get at least one hour of physical activity per day?:  yes, School year  How many hours a day are you in front of a screen other than for schoolwork (computer, TV, phone)?:  3  What do you do for exercise?:  Swimmer  Do you have interest/participate in community activities/volunteers/school sports?:  yes    MENTAL HEALTH SCREENING  PHQ-2 Total Score: 0 (7/16/2018  2:00 PM)  PHQ-9 Total Score: 0 (7/16/2018  2:00 PM)    VISION/HEARING  Vision: = completed, see results  Hearing:  Completed, see results    No exam data present    TB Risk Assessment:  The patient and/or parent/guardian answer positive to:  parents born outside of the [place]  patient and/or parent/guardian answer 'no' to all screening TB questions    Dyslipidemia Risk Screening  Have either of your parents or any of your grandparents had a stroke or heart attack before age 55?: No  Any parents with high cholesterol or currently taking medications to treat?: No     Dental  When was the last time you saw the dentist?: 6-12 months ago   Parent/Guardian declines the fluoride varnish application today. Fluoride not applied today.    Patient Active Problem List   Diagnosis     Vision Problems     Scoliosis     Vocal cord dysfunction       Drugs  Does the patient use tobacco/alcohol/drugs?:  no    Safety  Does the patient have any safety concerns (peer or home)?:  no  Does the patient use safety belts, helmets and other safety equipment?:  yes    Sex  Have you ever had sex?:  No    MEASUREMENTS  Height:     Weight:    BMI: There is no height or weight  on file to calculate BMI.  Blood Pressure:    No blood pressure reading on file for this encounter.    PHYSICAL EXAM  GEN: alert, well appearing  EYES: clear  R EAR: canal clear, TM pearly gray  L EAR: canal clear, TM pearly gray  NOSE: clear  OROPHARYNX: clear  NECK: supple, no significant LAD  CVS: RRR, no murmur  LUNGS: clear, no increased work of breathing  ABD: soft, non-tender, non-distended  : nl female  EXT: warm, well perfused, no swelling  MSK: nl muscle bulk, spine straight  NEURO: CN grossly intact, nl strength in UE and LE, nl gait, no dysmetria  SKIN: clear      Jennifer Conte MD

## 2021-06-20 NOTE — LETTER
Letter by Jennifer Conte MD at      Author: Jennifer Conte MD Service: -- Author Type: --    Filed:  Encounter Date: 8/24/2020 Status: (Other)       Parent/guardian of Maya Fay  [address] [facility] [address]             August 24, 2020         Dear Maya Reed,    Below are the results from Maya's recent visit:    Resulted Orders   HM2(CBC w/o Differential)   Result Value Ref Range    WBC 8.5 4.0 - 11.0 thou/uL    RBC 5.06 3.80 - 5.40 mill/uL    Hemoglobin 11.8 (L) 12.0 - 16.0 g/dL    Hematocrit 36.1 35.0 - 47.0 %    MCV 71 (L) 80 - 100 fL    MCH 23.2 (L) 27.0 - 34.0 pg    MCHC 32.5 32.0 - 36.0 g/dL    RDW 14.9 (H) 11.0 - 14.5 %    Platelets 340 140 - 440 thou/uL    MPV 8.2 7.0 - 10.0 fL   Maya has mild anemia (low hemoglobin) - about the same as when it was checked last year.  I would recommend that she take a daily iron supplement - will be best absorbed if she takes it with vitamin C as well and avoids having dairy within an hour of taking it.     Please call with questions or contact us using [employer].    Sincerely,        Electronically signed by Jennifer Conte MD

## 2021-06-20 NOTE — LETTER
Letter by Jennifer Conte MD at      Author: Jennifer Conte MD Service: -- Author Type: --    Filed:  Encounter Date: 8/27/2020 Status: (Other)         Maya Fay  [address] [facility] [address]             August 27, 2020         Dear Ms. Fay,    Below are the results from your recent visit:    Resulted Orders   Chlamydia trachomatis & Neisseria gonorrhoeae, Amplified Detection   Result Value Ref Range    Chlamydia trachomatis, Amplified Detection Negative Negative    Neisseria gonorrhoeae, Amplified Detection Negative Negative   HM2(CBC w/o Differential)   Result Value Ref Range    WBC 8.5 4.0 - 11.0 thou/uL    RBC 5.06 3.80 - 5.40 mill/uL    Hemoglobin 11.8 (L) 12.0 - 16.0 g/dL    Hematocrit 36.1 35.0 - 47.0 %    MCV 71 (L) 80 - 100 fL    MCH 23.2 (L) 27.0 - 34.0 pg    MCHC 32.5 32.0 - 36.0 g/dL    RDW 14.9 (H) 11.0 - 14.5 %    Platelets 340 140 - 440 thou/uL    MPV 8.2 7.0 - 10.0 fL        Urine screening test was negative (normal)     Please call with questions or contact us using "[employer].    Sincerely,        Electronically signed by Jennifer Conte MD

## 2021-06-21 NOTE — LETTER
Letter by Frida Alicia CMA at      Author: Frida Alicia CMA Service: -- Author Type: --    Filed:  Encounter Date: 10/15/2020 Status: (Other)       We've received instruction to get you scheduled for Tonsillectomy with Dr Lee. We have that arranged as follows:     Surgery Date: 12/18/2020    Location: [facility]                 3rd Floor, [facility]                  [address] [place] [id number]     Arrival Time: 5:30 am (unless instructed otherwise by the preop nurse)    Prep:     1. Schedule a preop physical with your primary care doctor. This may be virtual or face-to-face depending on your doctors preference fyi. Call them right away to schedule this.    2. COVID19 testing is required within 4 days of surgery. We have this scheduled for you at [facility], [address] [place] [address], 1st Floor at 9:30 am on 12/14/2020. Follow the specific instructions you receive by Alejandra. If your test is positive, your surgery will be canceled.     3. Nothing to eat or drink for 8 hours before surgery unless instructed differently by the preop nurse.    4. No blood thinners including aspirin for one week prior to surgery. Verify this is safe for you with your primary care doctor before stopping.     5. You need an adult to drive you home and stay with you 24 hours after surgery. Because of COVID19 related visitor restrictions, surgical patients are allowed one visitor only during the preoperative phase of surgery.    6. When you arrive to the hospital, you will be screened for COVID19 symptoms. If you screen positive, your surgery will need to be postponed for your safety.    7. If the community sees a new surge in COVID19 hospital admissions, your procedure may need to be postponed. We will contact you if this happens.    8. We always encourage you to notify your insurance any time you have something scheduled including surgery. The number is usually  right on the back of your insurance card.         Call our office if you have any questions! Thank you!

## 2021-06-23 NOTE — TELEPHONE ENCOUNTER
Pt just had a Pre Op with you. Do you want to add a AAP to this? She is over due. Or take off asthma from her problem list?

## 2021-06-23 NOTE — PROGRESS NOTES
Preoperative Exam    Scheduled Procedure: right shoulder surgery   Surgery Date:  1/24/2019  Surgery Location: [facility]  Surgeon:  Dr. Navarrete    Assessment/Plan:     Pre-op Exam    Surgical Procedure Risk: Low (reported cardiac risk generally < 1%)  Have you had prior anesthesia?: Yes  Have you or any family members had a previous anesthesia reaction: No  Do you or any family members have a history of a clotting or bleeding disorder?:  No    Patient approved for surgery with general or local anesthesia.        Functional Status: Age Appropriate [place]  Patient plans to recover at home with family.  Do you have any concerns regarding care after surgery?: No     Subjective:      Maya Fay is a 18 y.o. female who presents for a preoperative consultation.  Plan is for shoulder surgery in ten days.  Otherwise healthy now and without current illness    All other systems reviewed and are negative, other than those listed in the HPI.    Pertinent History  Any croup, wheezing or respiratory illness in the past 3 weeks?:  No  History of obstructive sleep apnea: No  Steroid use in the last 6 months: No  Any ibuprofen, NSAID or aspirin use in the last 2 weeks?: Yes: took ibuprofen 3 days ago - Maya knows to stop this prior to the surgery  Prior Blood Transfusion: No  Prior Blood Transfusion Reaction: n/a  If for some reason prior to, during or after the procedure, if it is medically indicated, would you be willing to have a blood transfusion?:  There is no transfusion refusal.  Any exposure in the past 3 weeks to chicken pox, Fifth disease, whooping cough, measles, tuberculosis?: No    Current Outpatient Medications   Medication Sig Dispense Refill     FLUoxetine (PROZAC) 20 MG tablet TAKE 2 TABLETS BY MOUTH EACH DAY. 60 tablet 1     PROAIR HFA 90 mcg/actuation inhaler Inhale 2 puffs every 4 (four) hours as needed.        ISOtretinoin (ACCUTANE) 40 MG capsule TAKE 1 CAPSULE TWICE A DAY BY  "MOUTH  0     SYMBICORT 80-4.5 mcg/actuation inhaler Inhale 2 puffs 2 (two) times a day.        No current facility-administered medications for this visit.         No Known Allergies    Patient Active Problem List   Diagnosis     Overweight     Allergic Rhinitis     Vocal cord dysfunction     Anxiety     Lumbar herniated disc     Mild intermittent asthma without complication       No past medical history on file.         No past surgical history on file.    Social History     Socioeconomic History     Marital status: Single     Spouse name: Not on file     Number of children: Not on file     Years of education: Not on file     Highest education level: Not on file   Social Needs     Financial resource strain: Not on file     Food insecurity - worry: Not on file     Food insecurity - inability: Not on file     Transportation needs - medical: Not on file     Transportation needs - non-medical: Not on file   Occupational History     Not on file   Tobacco Use     Smoking status: Never Smoker     Smokeless tobacco: Never Used   Substance and Sexual Activity     Alcohol use: Not on file     Drug use: Not on file     Sexual activity: Not on file   Other Topics Concern     Not on file   Social History Narrative     Not on file             Objective:     Vitals:    01/14/19 1434   BP: 104/64   Pulse: 66   Resp: 16   Temp: 98.6  F (37  C)   TempSrc: Oral   SpO2: 98%   Weight: 209 lb 6.4 oz (95 kg)   Height: 5' 4.5\" (1.638 m)   LMP: 01/01/2019         Physical Exam:  GEN: alert, well appearing  EYES: clear  R EAR: canal clear, TM pearly gray  L EAR: canal clear, TM pearly gray  NOSE: clear  OROPHARYNX: clear  NECK: supple, no significant LAD  CVS: RRR, no murmur  LUNGS: clear, no increased work of breathing  ABD: soft, non-tender, non-distended  EXT: warm, well perfused, no swelling  MSK: nl muscle bulk, spine straight  NEURO: CN grossly intact, nl strength in UE and LE, nl gait, no dysmetria  SKIN: clear      There are no " Patient Instructions on file for this visit.    Labs:  Physical on 01/14/2019   Component Date Value Ref Range Status     Pregnancy Test, Urine 01/14/2019 Negative  Negative Final     Specific Gravity, UA 01/14/2019 >1.030* 1.001 - 1.030 Final     Hemoglobin 01/14/2019 11.5* 12.0 - 16.0 g/dL Final         Immunization History   Administered Date(s) Administered     DTaP / HiB 04/30/2002     DTaP, historic 2001, 2001, 2001, 02/17/2006     HPV Quadrivalent 07/20/2012, 10/12/2012, 02/22/2013     Hep A, historic 03/05/2008, 11/06/2008     Hep B, historic 2001, 2001, 2001     HiB, historic,unspecified 2001, 2001, 2001     IPV 2001, 2001, 2001, 02/17/2006     Influenza [id number], 12/31/2009, 02/08/2010     Influenza, Seasonal, Inj PF IIV3 09/30/2010, 12/08/2011, 10/12/2012, 11/08/2013     Influenza, inj, historic,unspecified 01/07/2004, 12/20/2004, 01/07/2005, 10/26/2006, 10/19/2007, 11/06/2008     Influenza, seasonal,quad inj 36+ mos 10/23/2017     Influenza, seasonal,quad inj 6-35 mos 12/31/2009, 11/07/2014     Influenza,seasonal quad, PF, 36+MOS 12/18/2015, 12/19/2016, 11/19/2018     MMR 02/19/2002, 02/17/2006     Meningococcal B (PF) 07/17/2017, 07/19/2018     Meningococcal MCV4P 07/20/2012, 07/17/2017     Pneumo Conj 7-V(before 2010) 2001, 2001, 2001     Tdap 07/20/2012     Varicella 02/09/2002, 03/05/2008         Electronically signed by Jennifer Conte MD 01/14/19 2:38 PM

## 2021-06-23 NOTE — PROGRESS NOTES
"[facility]   1/14/2019 3:06 PM     HPI:    Here for pre-op  Also discussed anxiety  Taking Fluoxetine 40 mg daily  Last visit with me was six months ago - things were going well at that time and we made no changes    Senior year   Doing well overall    Reports not really struggling with mental health lately    Previously was seeing counselor but was doing well so counselor suggested decreasing frequency    No side effects  Pleased with medication and dose - wishes to continue with same    No trouble with appetite or sleep  Has been a little tough because she's had to miss most of her senior year swim season due to shoulder injury, but she is managing ok with this - still going to practice and helping out    Deciding about college  Considering [employer] or possibly [employer] in [place]    PHYSICAL EXAM:   /64   Pulse 66   Temp 98.6  F (37  C) (Oral)   Resp 16   Ht 5' 4.5\" (1.638 m)   Wt 209 lb 6.4 oz (95 kg)   LMP 01/01/2019 (Exact Date)   SpO2 98%   BMI 35.39 kg/m      GEN: alert, well appearing  PSYCH: normal affect  EYES: clear  R EAR: canal clear, TM pearly gray  L EAR: canal clear, TM pearly gray  NOSE: clear  OROPHARYNX: clear  NECK: supple, no significant LAD  CVS: RRR, no murmur  LUNGS: clear, no increased work of breathing    PHQ-9 Total Score: 2 (1/14/2019  2:00 PM)    SUDHEER-7 Total: 4 (7/19/2018 12:00 PM)  SUDHEER 7 Total Score: 3 (1/14/2019  2:00 PM)        ASSESSMENT:    Anxiety med check - doing well on current medication    PLAN:    Will continue on Fluoxetine 40 mg daily  Refills provided x six months  Next med check in six months or sooner PRMARIANN Conte MD       "

## 2021-06-27 ENCOUNTER — HEALTH MAINTENANCE LETTER (OUTPATIENT)
Age: 21
End: 2021-06-27

## 2021-07-03 NOTE — ADDENDUM NOTE
Addendum Note by Deja Dickey MD at 1/14/2021  2:30 PM     Author: Deja Dickey MD Service: -- Author Type: Physician    Filed: 1/16/2021  3:08 PM Encounter Date: 1/14/2021 Status: Signed    : Deja Dickey MD (Physician)    Addended by: DEJA DICKEY on: 1/16/2021 03:08 PM        Modules accepted: Orders

## 2021-07-03 NOTE — ADDENDUM NOTE
Addendum Note by Kelly Lee MD at 10/5/2020  4:09 PM     Author: Kelly Lee MD Service: -- Author Type: Physician    Filed: 10/12/2020  4:49 PM Encounter Date: 10/5/2020 Status: Signed    : Kelly Lee MD (Physician)    Addended by: KELLY LEE on: 10/12/2020 04:49 PM        Modules accepted: Orders

## 2021-08-06 NOTE — PROGRESS NOTES
[facility] Note    CC: facial pressure    HPI:   Maya Fay is a 17 y.o. old female previously healthy.  Patient presented to the clinic with a concern of severe facial pressure as has been on and off for 2 months.  She also report of headaches, cough, ear plugged, sore throat, subjective fever, rhinorrhea.  Patient reports that her symptoms started 2 months ago.  There was one week in between that symptoms seem to be improving.  However in the last week symptoms become worse.  She has history of asthma.  She used rescue inhaler more frequently.  She also reported has tried steam, ibuprofen and Sudafed.      Review of Systems   10-point review of systems negative except as noted above.    Past Medical History  Patient Active Problem List    Diagnosis Date Noted     Vocal cord dysfunction 07/28/2016     Overweight      Cough Variant Asthma      Mild persistent asthma without complication      Allergic Rhinitis        No past medical history on file.    Medications   Current Outpatient Prescriptions on File Prior to Visit   Medication Sig Dispense Refill     ACZONE 5 % topical gel APPLY TO AFFECTED AREA TOPICALLY EVERY MORNING  6     ampicillin (PRINCIPEN) 250 MG capsule 1 CAPSULE TWICE A DAY BY MOUTH  3     clindamycin (CLINDAGEL) 1 % gel Apply 1 application topically 2 (two) times a day.        EPIDUO 0.1-2.5 % GlwP Apply 1 application topically bedtime.        PROAIR HFA 90 mcg/actuation inhaler Inhale 2 puffs every 4 (four) hours as needed.        SYMBICORT 80-4.5 mcg/actuation inhaler Inhale 2 puffs 2 (two) times a day.        No current facility-administered medications on file prior to visit.          Physical Exam:  Visit Vitals     /60 (Patient Site: Right Arm, Patient Position: Sitting, Cuff Size: Adult Regular)     Pulse 70     Temp 99  F (37.2  C) (Oral)     Resp 18     Wt 167 lb (75.8 kg)     SpO2 99%     Breastfeeding No     General appearance: alert, appears stated age and  cooperative  Head: Normocephalic, without obvious abnormality, atraumatic  Eyes: negative findings: conjunctivae and sclerae normal  Ears: normal TM's and external ear canals both ears  Nose: no discharge, turbinates swollen, edematous.  Left side tenderness to palpation of the frontal or maxillary sinus  Throat: lips, mucosa, and tongue normal; teeth and gums normal  Neck: no adenopathy and thyroid not enlarged, symmetric, no tenderness/mass/nodules  Lungs: clear to auscultation bilaterally  Heart: regular rate and rhythm, S1, S2 normal, no murmur, click, rub or gallop    Assessment/Plan:   Symptoms consistent with activity of sinusitis.  Will treat patient with 10 days of Augmentin.  Continue with ibuprofen for pain and fever as needed.  Follow-up with primary physician if symptoms persist after treatment

## 2021-10-05 ENCOUNTER — OFFICE VISIT (OUTPATIENT)
Dept: PEDIATRICS | Facility: CLINIC | Age: 21
End: 2021-10-05
Payer: COMMERCIAL

## 2021-10-05 VITALS
BODY MASS INDEX: 35.52 KG/M2 | WEIGHT: 213.2 LBS | DIASTOLIC BLOOD PRESSURE: 68 MMHG | SYSTOLIC BLOOD PRESSURE: 112 MMHG | HEIGHT: 65 IN

## 2021-10-05 DIAGNOSIS — F41.9 ANXIETY: Primary | ICD-10-CM

## 2021-10-05 DIAGNOSIS — D50.9 IRON DEFICIENCY ANEMIA, UNSPECIFIED IRON DEFICIENCY ANEMIA TYPE: ICD-10-CM

## 2021-10-05 DIAGNOSIS — E55.9 VITAMIN D INSUFFICIENCY: ICD-10-CM

## 2021-10-05 LAB
ERYTHROCYTE [DISTWIDTH] IN BLOOD BY AUTOMATED COUNT: 15.3 % (ref 10–15)
HCT VFR BLD AUTO: 36.4 % (ref 35–47)
HGB BLD-MCNC: 11.7 G/DL (ref 11.7–15.7)
MCH RBC QN AUTO: 23.9 PG (ref 26.5–33)
MCHC RBC AUTO-ENTMCNC: 32.1 G/DL (ref 31.5–36.5)
MCV RBC AUTO: 74 FL (ref 78–100)
PLATELET # BLD AUTO: 323 10E3/UL (ref 150–450)
RBC # BLD AUTO: 4.9 10E6/UL (ref 3.8–5.2)
WBC # BLD AUTO: 9.5 10E3/UL (ref 4–11)

## 2021-10-05 PROCEDURE — 99214 OFFICE O/P EST MOD 30 MIN: CPT | Performed by: PEDIATRICS

## 2021-10-05 PROCEDURE — 36415 COLL VENOUS BLD VENIPUNCTURE: CPT | Performed by: PEDIATRICS

## 2021-10-05 PROCEDURE — 82306 VITAMIN D 25 HYDROXY: CPT | Performed by: PEDIATRICS

## 2021-10-05 PROCEDURE — 96127 BRIEF EMOTIONAL/BEHAV ASSMT: CPT | Performed by: PEDIATRICS

## 2021-10-05 PROCEDURE — 85027 COMPLETE CBC AUTOMATED: CPT | Performed by: PEDIATRICS

## 2021-10-05 RX ORDER — SERTRALINE HYDROCHLORIDE 100 MG/1
TABLET, FILM COATED ORAL
Qty: 145 TABLET | Refills: 0 | Status: SHIPPED | OUTPATIENT
Start: 2021-10-05 | End: 2022-01-08

## 2021-10-05 ASSESSMENT — ANXIETY QUESTIONNAIRES
5. BEING SO RESTLESS THAT IT IS HARD TO SIT STILL: NEARLY EVERY DAY
7. FEELING AFRAID AS IF SOMETHING AWFUL MIGHT HAPPEN: NOT AT ALL
IF YOU CHECKED OFF ANY PROBLEMS ON THIS QUESTIONNAIRE, HOW DIFFICULT HAVE THESE PROBLEMS MADE IT FOR YOU TO DO YOUR WORK, TAKE CARE OF THINGS AT HOME, OR GET ALONG WITH OTHER PEOPLE: NOT DIFFICULT AT ALL
6. BECOMING EASILY ANNOYED OR IRRITABLE: MORE THAN HALF THE DAYS
3. WORRYING TOO MUCH ABOUT DIFFERENT THINGS: MORE THAN HALF THE DAYS
2. NOT BEING ABLE TO STOP OR CONTROL WORRYING: NOT AT ALL
GAD7 TOTAL SCORE: 12
1. FEELING NERVOUS, ANXIOUS, OR ON EDGE: MORE THAN HALF THE DAYS

## 2021-10-05 ASSESSMENT — MIFFLIN-ST. JEOR: SCORE: 1737.95

## 2021-10-05 ASSESSMENT — PATIENT HEALTH QUESTIONNAIRE - PHQ9
5. POOR APPETITE OR OVEREATING: NEARLY EVERY DAY
SUM OF ALL RESPONSES TO PHQ QUESTIONS 1-9: 12

## 2021-10-05 NOTE — LETTER
October 5, 2021      Maya Fay  [address] [place] [address]        To Whom It May Concern,     Maya Fay attended clinic here on Oct 5, 2021 and may return to school after the appointmenn today.    If you have questions or concerns, please call the clinic at the number listed above.    Sincerely,         Jennifer Conte MD

## 2021-10-05 NOTE — PATIENT INSTRUCTIONS
Let's try an increase in dose for your Sertraline up to 150 mg today    Also checking labs - keep an eye on your MyChart for results    Will see you in about 6 weeks for your checkup and will follow-up on how the medication change is going then

## 2021-10-05 NOTE — PROGRESS NOTES
"    Assessment & Plan     Anxiety  Doing fairly well but still with consistent symptoms.  Discussed option of increasing dose and Maya would like to try this.  Increase Sertraline to 150 mg daily - new Rx sent.  - sertraline (ZOLOFT) 100 MG tablet; Take 1 and 1/2 tabs PO daily    Iron deficiency anemia, unspecified iron deficiency anemia type  Hgb slightly improved at 11.7 but still with low MCV and high RDW.  Encouraged Maya to try to take the iron supplement at least 3-4 times per week, or even every day if she can between now and our next visit in November.  - CBC with platelets    Vitamin D insufficiency  Vitamin D has been low in the past - level drawn today .  - Vitamin D Deficiency  Level from today's draw remains a bit low at 26 - recommended high dose 50,000 supplement once weekly x 6 weeks, then return to daily supplement of about 1000 international unit(s) daily      30 minutes spent on the date of the encounter doing chart review, patient visit and documentation        BMI:   Estimated body mass index is 35.48 kg/m  as calculated from the following:    Height as of this encounter: 5' 5\" (1.651 m).    Weight as of this encounter: 213 lb 3.2 oz (96.7 kg).       Depression Screening Follow Up    PHQ 10/5/2021   PHQ-9 Total Score 12   Q9: Thoughts of better off dead/self-harm past 2 weeks Not at all   PHQ-A Total Score -   PHQ-A Depressed most days in past year -   PHQ-A Mood affect on daily activities -   PHQ-A Suicide Ideation past 2 weeks -   PHQ-A Suicide Ideation past month -   PHQ-A Previous suicide attempt -     SUDHEER-7 SCORE 8/24/2020 1/14/2021 10/5/2021   Total Score 3 8 12             Follow Up Actions Taken    RTC 6 weeks for well care as already scheduled - will check in then about how things are going with the higher dose of Sertraline    Return in about 6 weeks (around 11/16/2021).    Jennifer Conte MD  [facility]   Maya is a 21 year " "old who presents for the following health issues      HPI     Here for follow-up medication check related to anxiety    My last visit with Maya was about 8 months ago to discuss fatigue   At that time her medication was being managed by psychiatry    We did find that day that Maya's hemoglobin was low at 11.2 and her vitamin D was low at 17.3  I recommended taking an iron supplement and vitamin D supplement  Maya reports today that she takes a multivitamin pretty regularly about 3 days a week  Also occasionally takes extra vitamin D and iron - maybe once a week    Today Maya shares that her psychiatrist left the practice  Taking Sertraline 100  Mg daily  Feels like this has been helpful    Worked with a counselor last year through school ([employer]) - that person brought up possibility that Maya may have a hard time with focus and attention - sometimes during a conversation she'll space out or \"brain has so much but what comes out doesn't make sense\"    Does notice that she has a hard time staying focused in class  Does remember being easily distracted when she was a younger child but no concerns ever came up in elementary school    Does feel irritable a lot and sometimes hard to relax but doesn't feel liek she's worrying too much  Sleep is all over the place - up and down  Mood has been mostly good but goes in waves    Does feel like the Sertraline has been good and helpful for her  Thinks she's been on this dose for a while    Does tend to struggle more with mental health in the winter  Would be open to increasing dose and thinks this would be helpful        Objective    /68 (BP Location: Left arm, Patient Position: Sitting, Cuff Size: Adult Regular)   Ht 5' 5\" (1.651 m)   Wt 213 lb 3.2 oz (96.7 kg)   LMP 09/11/2021 (Exact Date)   Breastfeeding No   BMI 35.48 kg/m    Body mass index is 35.48 kg/m .  Physical Exam     GEN: alert and well appearing  PSYCH: normal affect, normal speech, " good eye contact    Jennifer Conte MD

## 2021-10-06 LAB — DEPRECATED CALCIDIOL+CALCIFEROL SERPL-MC: 26 UG/L (ref 30–80)

## 2021-10-06 ASSESSMENT — ANXIETY QUESTIONNAIRES: GAD7 TOTAL SCORE: 12

## 2021-10-06 ASSESSMENT — ASTHMA QUESTIONNAIRES: ACT_TOTALSCORE: 25

## 2021-10-07 RX ORDER — ERGOCALCIFEROL 1.25 MG/1
CAPSULE, LIQUID FILLED ORAL
Qty: 6 CAPSULE | Refills: 0 | Status: SHIPPED | OUTPATIENT
Start: 2021-10-07 | End: 2021-11-16

## 2021-10-17 ENCOUNTER — HEALTH MAINTENANCE LETTER (OUTPATIENT)
Age: 21
End: 2021-10-17

## 2021-11-16 ENCOUNTER — TELEPHONE (OUTPATIENT)
Dept: PEDIATRICS | Facility: CLINIC | Age: 21
End: 2021-11-16

## 2021-11-16 ENCOUNTER — OFFICE VISIT (OUTPATIENT)
Dept: PEDIATRICS | Facility: CLINIC | Age: 21
End: 2021-11-16
Payer: COMMERCIAL

## 2021-11-16 VITALS
HEART RATE: 64 BPM | HEIGHT: 65 IN | WEIGHT: 210.4 LBS | DIASTOLIC BLOOD PRESSURE: 60 MMHG | SYSTOLIC BLOOD PRESSURE: 90 MMHG | BODY MASS INDEX: 35.06 KG/M2

## 2021-11-16 DIAGNOSIS — J45.20 MILD INTERMITTENT ASTHMA WITHOUT COMPLICATION: ICD-10-CM

## 2021-11-16 DIAGNOSIS — E66.9 OBESITY WITHOUT SERIOUS COMORBIDITY, UNSPECIFIED CLASSIFICATION, UNSPECIFIED OBESITY TYPE: ICD-10-CM

## 2021-11-16 DIAGNOSIS — E55.9 VITAMIN D INSUFFICIENCY: ICD-10-CM

## 2021-11-16 DIAGNOSIS — F41.9 ANXIETY: ICD-10-CM

## 2021-11-16 DIAGNOSIS — D50.9 IRON DEFICIENCY ANEMIA, UNSPECIFIED IRON DEFICIENCY ANEMIA TYPE: ICD-10-CM

## 2021-11-16 DIAGNOSIS — Z00.129 ENCOUNTER FOR ROUTINE CHILD HEALTH EXAMINATION W/O ABNORMAL FINDINGS: Primary | ICD-10-CM

## 2021-11-16 DIAGNOSIS — N94.6 DYSMENORRHEA: ICD-10-CM

## 2021-11-16 PROCEDURE — 96127 BRIEF EMOTIONAL/BEHAV ASSMT: CPT | Mod: 59 | Performed by: PEDIATRICS

## 2021-11-16 PROCEDURE — 99213 OFFICE O/P EST LOW 20 MIN: CPT | Mod: 25 | Performed by: PEDIATRICS

## 2021-11-16 PROCEDURE — 99395 PREV VISIT EST AGE 18-39: CPT | Performed by: PEDIATRICS

## 2021-11-16 SDOH — ECONOMIC STABILITY: INCOME INSECURITY: IN THE LAST 12 MONTHS, WAS THERE A TIME WHEN YOU WERE NOT ABLE TO PAY THE MORTGAGE OR RENT ON TIME?: NO

## 2021-11-16 ASSESSMENT — ASTHMA QUESTIONNAIRES
QUESTION_1 LAST FOUR WEEKS HOW MUCH OF THE TIME DID YOUR ASTHMA KEEP YOU FROM GETTING AS MUCH DONE AT WORK, SCHOOL OR AT HOME: NONE OF THE TIME
QUESTION_5 LAST FOUR WEEKS HOW WOULD YOU RATE YOUR ASTHMA CONTROL: COMPLETELY CONTROLLED
ACT_TOTALSCORE: 25
QUESTION_2 LAST FOUR WEEKS HOW OFTEN HAVE YOU HAD SHORTNESS OF BREATH: NOT AT ALL
QUESTION_4 LAST FOUR WEEKS HOW OFTEN HAVE YOU USED YOUR RESCUE INHALER OR NEBULIZER MEDICATION (SUCH AS ALBUTEROL): NOT AT ALL
QUESTION_3 LAST FOUR WEEKS HOW OFTEN DID YOUR ASTHMA SYMPTOMS (WHEEZING, COUGHING, SHORTNESS OF BREATH, CHEST TIGHTNESS OR PAIN) WAKE YOU UP AT NIGHT OR EARLIER THAN USUAL IN THE MORNING: NOT AT ALL

## 2021-11-16 ASSESSMENT — ANXIETY QUESTIONNAIRES
6. BECOMING EASILY ANNOYED OR IRRITABLE: NOT AT ALL
5. BEING SO RESTLESS THAT IT IS HARD TO SIT STILL: SEVERAL DAYS
GAD7 TOTAL SCORE: 5
7. FEELING AFRAID AS IF SOMETHING AWFUL MIGHT HAPPEN: NOT AT ALL
2. NOT BEING ABLE TO STOP OR CONTROL WORRYING: SEVERAL DAYS
IF YOU CHECKED OFF ANY PROBLEMS ON THIS QUESTIONNAIRE, HOW DIFFICULT HAVE THESE PROBLEMS MADE IT FOR YOU TO DO YOUR WORK, TAKE CARE OF THINGS AT HOME, OR GET ALONG WITH OTHER PEOPLE: SOMEWHAT DIFFICULT
4. TROUBLE RELAXING: SEVERAL DAYS
3. WORRYING TOO MUCH ABOUT DIFFERENT THINGS: SEVERAL DAYS
1. FEELING NERVOUS, ANXIOUS, OR ON EDGE: SEVERAL DAYS

## 2021-11-16 ASSESSMENT — MIFFLIN-ST. JEOR: SCORE: 1722.12

## 2021-11-16 NOTE — LETTER
November 16, 2021      Maya Fay  [address] [place] [address]        To Whom It May Concern:    Maya Fay was seen in our clinic on 11/16/21.  Please excuse her absence.  She may return to school without restrictions.      Sincerely,        Jennifer Conte MD

## 2021-11-16 NOTE — TELEPHONE ENCOUNTER
"11-16-21  Reason for Call:  Letter for school    Detailed comments: pt called stated she had an appt with Dr Conte on 11-16-21 and needs a letter for school because she missed a activity so school doesn't think she \"skipped\" school  ATTN: [employer]   Update in my-chart    Phone Number Patient can be reached at: Cell number on file:    Telephone Information:   Mobile [phone]       Best Time: anytime    Can we leave a detailed message on this number? YES    Call taken on 11/16/2021 at 1:21 PM by Toshia Rodriguez    "

## 2021-11-16 NOTE — PATIENT INSTRUCTIONS
I'm glad that you are doing well in terms of anxiety  Continue on same - 150 mg daily    Check to make sure your multivitamin contains iron  Continue taking this daily     Let's plan to recheck your hemoglobin at next visit in six months (as well as vitamin D)    Patient Education    BRIGHT [employer] HANDOUT- PATIENT  18 THROUGH 21 YEAR VISITS  Here are some suggestions from [employer] experts that may be of value to your family.     HOW YOU ARE DOING  Enjoy spending time with your family.  Find activities you are really interested in, such as sports, theater, or volunteering.  Try to be responsible for your schoolwork or work obligations.  Always talk through problems and never use violence.  If you get angry with someone, try to walk away.  If you feel unsafe in your home or have been hurt by someone, let us know. Hotlines and community agencies can also provide confidential help.  Talk with us if you are worried about your living or food situation. Community agencies and programs such as SNAP can help.  Don t smoke, vape, or use drugs. Avoid people who do when you can. Talk with us if you are worried about alcohol or drug use in your family.    YOUR DAILY LIFE  Visit the dentist at least twice a year.  Brush your teeth at least twice a day and floss once a day.  Be a healthy eater.  Have vegetables, fruits, lean protein, and whole grains at meals and snacks.  Limit fatty, sugary, salty foods that are low in nutrients, such as candy, chips, and ice cream.  Eat when you re hungry. Stop when you feel satisfied.  Eat breakfast.  Drink plenty of water.  Make sure to get enough calcium every day.  Have 3 or more servings of low-fat (1%) or fat-free milk and other low-fat dairy products, such as yogurt and cheese.  Women: Make sure to eat foods rich in folate, such as fortified grains and dark- green leafy vegetables.  Aim for at least 1 hour of physical activity every day.  Wear safety equipment when you play  sports.  Get enough sleep.  Talk with us about managing your health care and insurance as an adult.    YOUR FEELINGS  Most people have ups and downs. If you are feeling sad, depressed, nervous, irritable, hopeless, or angry, let us know or reach out to another health care professional.  Figure out healthy ways to deal with stress.  Try your best to solve problems and make decisions on your own.  Sexuality is an important part of your life. If you have any questions or concerns, we are here for you.    HEALTHY BEHAVIOR CHOICES  Avoid using drugs, alcohol, tobacco, steroids, and diet pills. Support friends who choose not to use.  If you use drugs or alcohol, let us know or talk with another trusted adult about it. We can help you with quitting or cutting down on your use.  Make healthy decisions about your sexual behavior.  If you are sexually active, always practice safe sex. Always use birth control along with a condom to prevent pregnancy and sexually transmitted infections.  All sexual activity should be something you want. No one should ever force or try to convince you.  Protect your hearing at work, home, and concerts. Keep your earbud volume down.    STAYING SAFE  Always be a safe and cautious .  Insist that everyone use a lap and shoulder seat belt.  Limit the number of friends in the car and avoid driving at night.  Avoid distractions. Never text or talk on the phone while you drive.  Do not ride in a vehicle with someone who has been using drugs or alcohol.  If you feel unsafe driving or riding with someone, call someone you trust to drive you.  Wear helmets and protective gear while playing sports. Wear a helmet when riding a bike, a motorcycle, or an ATV or when skiing or skateboarding.  Always use sunscreen and a hat when you re outside.  Fighting and carrying weapons can be dangerous. Talk with your parents, teachers, or doctor about how to avoid these situations.        Consistent with [employer]  Futures: Guidelines for Health Supervision of Infants, Children, and Adolescents, 4th Edition  For more information, go to https://brightfutures.aap.org.

## 2021-11-16 NOTE — TELEPHONE ENCOUNTER
Leyla Trejo that letter is available in mychart for her. MARIA T CONLEY on 11/16/2021 at 3:05 PM

## 2021-11-16 NOTE — PROGRESS NOTES
"Maay Fay is 21 year old, here for a preventive care visit.    Assessment & Plan     (Z00.129) Encounter for routine child health examination w/o abnormal findings  (primary encounter diagnosis)  Plan: BEHAVIORAL/EMOTIONAL ASSESSMENT (95013)    (F41.9) Anxiety  Doing well on current medication - Sertraline 150 mg  Has struggled with seasonal depression in past so is a bit worried about upcoming winter and darkness - discussed being proactive with \"Happy Light\" in the morning, continue exercising daily  Plan: I'm glad that you are doing well in terms of anxiety  Continue on same - 150 mg daily    (D50.9) Iron deficiency anemia, unspecified iron deficiency anemia type  (E55.9) Vitamin D insufficiency  Check to make sure your multivitamin contains iron  Continue taking this daily   Let's plan to recheck your hemoglobin at next visit in six months (as well as vitamin D)    (N94.6) Dysmenorrhea  Maya is interested in starting some sort of birth control - hoping it will help with regulating periods, as well as acne  Not currently sexually active but would like to be proactive about pregnancy prevention for the future  Discussed options - OCP, IUD, Nexplanon  Maya thinks she would like to do a long-term birth control option like IUD or Nexplanon  Will schedule a visit with one of our family practice partners to discuss options and proceed with this    (J45.20) Mild intermittent asthma  Stable and doing well  Rare need for albuterol -  Mostly with URIs  Has enough supply now - no need for refill today    Growth        Normal height and weight  BMI is in obesity range   Discussed healthy eating and importance of regular exercise    Immunizations     Already had flu vaccine  MenB Vaccine series already completed.    Anticipatory Guidance    Reviewed age appropriate anticipatory guidance.   The following topics were discussed:  SOCIAL/ FAMILY:    Parent/ teen communication    Social media    School/ homework    " "Transition to adult care provider  NUTRITION:    Healthy food choices    Vitamins/ supplements  HEALTH / SAFETY:    Adequate sleep/ exercise    Drugs, ETOH, smoking  SEXUALITY:    Menstruation    Dating/ relationships    Encourage abstinence          Referrals/Ongoing Specialty Care  No    Follow Up      Return in 1 year (on 11/16/2022) for Preventive Care visit.    Subjective     No flowsheet data found.  Patient has been advised of split billing requirements and indicates understanding: Yes      I saw Maya six weeks ago and discused:  Anxiety - we increased her dose of Setraline to 150 mg daily  Today - Maya reports that this was helpful  Head feels more clear  Still has some anxiety but manageable  Feels like she worries about normal things that everyone worries about like a big test, but not much worrying outside of those things  Has struggled with depressed mood in winter before due to seasonal darkness and so is feeling a bit anxious about this with winter coming but has a \"happy light\" she plans to use that has helped in the past    Iron-Def Anemia - Hgb was 11.7 6 weeks ago which was slightly improved but still with low MCV and high RDW - I encouraged her to take her iron supplement more regularly    Vit D - was slightly low a 26 (6 weeks ago) - I prescribed high dose 50,000 international unit(s) supplement weekly x 6 weeks  Did this x 6 doses    Currently taking a MVI (not sure if it contains iron but thinks it does)  has'nt taken the iron supplement for a while  Takes the MVI probably 5 days a week    Has albuterol inhaler which she uses when she has a cold with a cough, although hasn't needed to use it for a while  When Maya is healthy, doesn't typically cough - maybe occasionally with allergies    Has taken OCP in the past - to help with acne  It seemed to help in the past  Periods are very irregular lately and this is annoying  Did well on the OCP in the past and doesn't recall any side " effects      Social 11/16/2021   Who do you live with? Friends or roommates   Have you experienced any stressful events recently? None   In the past 12 months, has lack of transportation kept you from medical appointments or from getting medications? No   In the last 12 months, was there a time when you were not able to pay the mortgage or rent on time? No   In the last 12 months, was there a time when you did not have a steady place to sleep or slept in a shelter (including now)? No       Health Risks/Safety 11/16/2021   Do you always wear a seat belt? Yes   Do you wear a helmet for bicyle, rollerblades, skatebard, scooter, skiing/snowboarding, ATV/snowmobile, motorcycle?  Yes          TB Screening 11/16/2021   Since your last Well Child visit, have any of your family members or close contacts had tuberculosis or a positive tuberculosis test?  No   Since your last check-up, have you or any of your family members or close contacts traveled or lived outside of the [place]? No   Since your last check-up, have you lived in a high-risk group setting like a correctional facility, health care facility, homeless shelter, or refugee camp? No        Dyslipidemia Screening 11/16/2021   Have any of your parents or grandparents had a stroke or heart attack before age 55 for males or before age 65 for females? (!) UNKNOWN   Do either of your parents have high cholesterol or currently taking medications to treat? No    Risk Factors: None    No flowsheet data found.  Dental Fluoride Varnish:   No, parent/guardian declines fluoride varnish.  Diet 11/16/2021   Do you have questions about your eating?  No   Do you have questions about your weight?  No   What do you regularly drink? Water, (!) COFFEE OR TEA   What type of water? Tap, (!) FILTERED   Do you think you eat healthy foods? Yes   Do you get at least 3 servings of food or beverages that have calcium each day (dairy, green leafy vegetables, etc.)? Yes   How would you  "describe your diet?  (!) LOW CARBOHYDRATE, (!) BREAKFAST SKIPPED   Within the past 12 months, you worried that your food would run out before you got money to buy more. Never true   Within the past 12 months, the food you bought just didn't last and you didn't have money to get more. Never true       Activity 11/16/2021   On average, how many days per week do you engage in moderate to strenuous exercise (like walking fast, running, jogging, dancing, swimming, biking, or other activities that cause a light or heavy sweat)? 4 days   On average, how many minutes do you engage in exercise at this level? (!) 50 MINUTES   What do you do for exercise? Walk, swim, weights   What activities are you involved with? Work, school     Media Use 11/16/2021   How many hours per day are you viewing a screen?  4-5     Sleep 11/16/2021   Do you have any trouble with sleep? (!) DAYTIME DROWSINESS OR TAKE NAPS, (!) DIFFICULTY FALLING ASLEEP     Vision/Hearing 11/16/2021   Do you have any concerns about your hearing or vision?  No concerns     Vision Screen       Hearing Screen         School 11/16/2021   Are you in school? Yes   What school do you attend?  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  (!) IRREGULAR     SUDHEER-7 SCORE 8/24/2020 1/14/2021 10/5/2021   Total Score 3 8 12          Objective     Exam  BP 90/60   Pulse 64   Ht 5' 4.8\" (1.646 m)   Wt 210 lb 6.4 oz (95.4 kg)   LMP 11/08/2021 (Exact Date)   BMI 35.23 kg/m    Facility age limit for growth percentiles " is 20 years.  Facility age limit for growth percentiles is 20 years.  Facility age limit for growth percentiles is 20 years.  Growth percentile SmartLinks can only be used for patients less than 20 years old.  Physical Exam  GENERAL: Active, alert, in no acute distress.  SKIN: Clear. No significant rash, abnormal pigmentation or lesions  HEAD: Normocephalic  EYES: Pupils equal, round, reactive, Extraocular muscles intact. Normal conjunctivae.  EARS: Normal canals. Tympanic membranes are normal; gray and translucent.  NOSE: Normal without discharge.  MOUTH/THROAT: Clear. No oral lesions. Teeth without obvious abnormalities.  NECK: Supple, no masses.  No thyromegaly.  LYMPH NODES: No adenopathy  LUNGS: Clear. No rales, rhonchi, wheezing or retractions  HEART: Regular rhythm. Normal S1/S2. No murmurs. Normal pulses.  ABDOMEN: Soft, non-tender, not distended, no masses or hepatosplenomegaly. Bowel sounds normal.   NEUROLOGIC: No focal findings. Cranial nerves grossly intact: DTR's normal. Normal gait, strength and tone  BACK: Spine is straight, no scoliosis.  EXTREMITIES: Full range of motion, no deformities  : deferred        Jennifer Conte MD  [facility]  Answers for HPI/ROS submitted by the patient on 11/16/2021  Frequency of exercise:: 4-5 days/week  Getting at least 3 servings of Calcium per day:: Yes  Diet:: Carbohydrate counting  Taking medications regularly:: Yes  Medication side effects:: None  Bi-annual eye exam:: NO  Dental care twice a year:: Yes  Sleep apnea or symptoms of sleep apnea:: None  Additional concerns today:: No  Duration of exercise:: 30-45 minutes

## 2021-11-16 NOTE — TELEPHONE ENCOUNTER
I just completed a letter in Maya's chart - please let her know that she should be able to access it now through [employer].

## 2021-11-17 ASSESSMENT — ASTHMA QUESTIONNAIRES: ACT_TOTALSCORE: 25

## 2021-12-30 ENCOUNTER — IMMUNIZATION (OUTPATIENT)
Dept: NURSING | Facility: CLINIC | Age: 21
End: 2021-12-30
Payer: COMMERCIAL

## 2021-12-30 PROCEDURE — 91300 PR COVID VAC PFIZER DIL RECON 30 MCG/0.3 ML IM: CPT

## 2021-12-30 PROCEDURE — 0004A PR COVID VAC PFIZER DIL RECON 30 MCG/0.3 ML IM: CPT

## 2022-01-06 DIAGNOSIS — F41.9 ANXIETY: ICD-10-CM

## 2022-01-08 RX ORDER — SERTRALINE HYDROCHLORIDE 100 MG/1
TABLET, FILM COATED ORAL
Qty: 145 TABLET | Refills: 2 | Status: SHIPPED | OUTPATIENT
Start: 2022-01-08

## 2022-01-09 NOTE — TELEPHONE ENCOUNTER
"Last Written Prescription Date:  10/5/2021  Last Fill Quantity: 145,  # refills: 0   Last office visit provider:  11/16/2021 Dr. Conte     Requested Prescriptions   Pending Prescriptions Disp Refills     sertraline (ZOLOFT) 100 MG tablet [Pharmacy Med Name: Sertraline HCl Oral Tablet 100 MG] 145 tablet 0     Sig: TAKE 1.5 TABLETS BY MOUTH DAILY       SSRIs Protocol Passed - 1/6/2022  2:38 PM        Passed - Recent (12 mo) or future (30 days) visit within the authorizing provider's specialty     Patient has had an office visit with the authorizing provider or a provider within the authorizing providers department within the previous 12 mos or has a future within next 30 days. See \"Patient Info\" tab in inbasket, or \"Choose Columns\" in Meds & Orders section of the refill encounter.              Passed - Medication is active on med list        Passed - Patient is age 18 or older        Passed - No active pregnancy on record        Passed - No positive pregnancy test in last 12 months             Eri Marinelli RN 01/08/22 11:07 PM  "

## 2022-10-01 ENCOUNTER — HEALTH MAINTENANCE LETTER (OUTPATIENT)
Age: 22
End: 2022-10-01

## 2022-11-21 ENCOUNTER — TRANSFERRED RECORDS (OUTPATIENT)
Dept: HEALTH INFORMATION MANAGEMENT | Facility: CLINIC | Age: 22
End: 2022-11-21

## 2023-02-05 ENCOUNTER — HEALTH MAINTENANCE LETTER (OUTPATIENT)
Age: 23
End: 2023-02-05

## 2024-03-03 ENCOUNTER — HEALTH MAINTENANCE LETTER (OUTPATIENT)
Age: 24
End: 2024-03-03

## 2024-04-01 ENCOUNTER — TRANSFERRED RECORDS (OUTPATIENT)
Dept: HEALTH INFORMATION MANAGEMENT | Facility: CLINIC | Age: 24
End: 2024-04-01
Payer: COMMERCIAL

## 2024-07-30 ENCOUNTER — TRANSFERRED RECORDS (OUTPATIENT)
Dept: HEALTH INFORMATION MANAGEMENT | Facility: CLINIC | Age: 24
End: 2024-07-30
Payer: COMMERCIAL

## 2024-09-11 ENCOUNTER — TRANSFERRED RECORDS (OUTPATIENT)
Dept: HEALTH INFORMATION MANAGEMENT | Facility: CLINIC | Age: 24
End: 2024-09-11
Payer: COMMERCIAL

## 2025-01-06 NOTE — TELEPHONE ENCOUNTER
Patient notified and lab visit scheduled for March.   Sofia Perez LPN    
Please call Maya to let her know that her vitamin D level came back low at 17.3.  I would recommend that she start taking a vitamin D supplement at dose of 5000 international unit(s) daily.  I had also previously sent her a message letting her know that her hemoglobin is also low at 11.2 and I'd recommend she start taking an iron supplement of 325 mg daily.  She should be able to find both of these over the counter.  I'd like for her to return for a lab visit blood draw to recheck both of these in about two months - please help schedule that appointment for her.  Thanks!  
Female